# Patient Record
Sex: MALE | Race: OTHER | Employment: OTHER | URBAN - METROPOLITAN AREA
[De-identification: names, ages, dates, MRNs, and addresses within clinical notes are randomized per-mention and may not be internally consistent; named-entity substitution may affect disease eponyms.]

---

## 2017-01-16 ENCOUNTER — LAB REQUISITION (OUTPATIENT)
Dept: LAB | Facility: HOSPITAL | Age: 67
End: 2017-01-16

## 2017-01-16 DIAGNOSIS — R73.9 HYPERGLYCEMIA: ICD-10-CM

## 2017-01-16 PROCEDURE — 83036 HEMOGLOBIN GLYCOSYLATED A1C: CPT | Performed by: GENERAL PRACTICE

## 2017-01-17 LAB — HBA1C MFR BLD: 7.9 % (ref 4–6)

## 2017-10-13 ENCOUNTER — LAB REQUISITION (OUTPATIENT)
Dept: LAB | Facility: HOSPITAL | Age: 67
End: 2017-10-13
Payer: COMMERCIAL

## 2017-10-13 DIAGNOSIS — Z12.5 ENCOUNTER FOR SCREENING FOR MALIGNANT NEOPLASM OF PROSTATE: ICD-10-CM

## 2017-10-13 DIAGNOSIS — E11.65 TYPE 2 DIABETES MELLITUS WITH HYPERGLYCEMIA (HCC): ICD-10-CM

## 2017-10-13 PROCEDURE — 80061 LIPID PANEL: CPT | Performed by: GENERAL PRACTICE

## 2017-10-13 PROCEDURE — 83036 HEMOGLOBIN GLYCOSYLATED A1C: CPT | Performed by: GENERAL PRACTICE

## 2017-10-13 PROCEDURE — 80053 COMPREHEN METABOLIC PANEL: CPT | Performed by: GENERAL PRACTICE

## 2018-01-04 ENCOUNTER — OFFICE VISIT (OUTPATIENT)
Dept: FAMILY MEDICINE CLINIC | Facility: CLINIC | Age: 68
End: 2018-01-04

## 2018-01-04 VITALS
HEIGHT: 68 IN | HEART RATE: 51 BPM | WEIGHT: 137 LBS | TEMPERATURE: 98 F | DIASTOLIC BLOOD PRESSURE: 67 MMHG | BODY MASS INDEX: 20.76 KG/M2 | SYSTOLIC BLOOD PRESSURE: 133 MMHG

## 2018-01-04 DIAGNOSIS — Z12.11 COLON CANCER SCREENING: ICD-10-CM

## 2018-01-04 DIAGNOSIS — IMO0001 UNCONTROLLED TYPE 2 DIABETES MELLITUS WITHOUT COMPLICATION, WITH LONG-TERM CURRENT USE OF INSULIN: Primary | ICD-10-CM

## 2018-01-04 DIAGNOSIS — R20.8 BURNING SENSATION OF FEET: ICD-10-CM

## 2018-01-04 DIAGNOSIS — E78.00 HYPERCHOLESTEREMIA: ICD-10-CM

## 2018-01-04 PROCEDURE — 99212 OFFICE O/P EST SF 10 MIN: CPT | Performed by: FAMILY MEDICINE

## 2018-01-04 PROCEDURE — 99203 OFFICE O/P NEW LOW 30 MIN: CPT | Performed by: FAMILY MEDICINE

## 2018-01-04 RX ORDER — PANTOPRAZOLE SODIUM 40 MG/1
40 TABLET, DELAYED RELEASE ORAL
COMMUNITY
End: 2018-10-22

## 2018-01-04 NOTE — PROGRESS NOTES
HPI:    Patient ID: Elisa Lopez is a 79year old male. HPI     Patient is here new to me. He states his former PCP is retiring. He states his diabetes has been somewhat controlled. Last A1c was 7.8 and prior to that he states has been 7.5.   He i BD INSULIN SYRINGE 30G X 1/2\" 0.5 ML Does not apply Misc 1/2ml cc capacity/12.7mm in Length/30 gauge BD insulin Syringes with BD Ultra Fine [id number] STERILE SINGLE USE SYRINGES for U-100 INSULINDoses up to [facility]# [id number] LOT [id number]  Disp: Metabolic Panel (8) [E]      Lipid Panel [E]      TSH W Reflex To Free T4 [E]      Vitamin B12 [E]      Hemoglobin A1C [E]      Microalb/Creat Ratio, Random Urine [E]    Meds This Visit:    No prescriptions requested or ordered in this encounter       Imag

## 2018-01-09 ENCOUNTER — OFFICE VISIT (OUTPATIENT)
Dept: PODIATRY CLINIC | Facility: CLINIC | Age: 68
End: 2018-01-09

## 2018-01-09 DIAGNOSIS — E11.42 DIABETIC POLYNEUROPATHY ASSOCIATED WITH TYPE 2 DIABETES MELLITUS (HCC): Primary | ICD-10-CM

## 2018-01-09 DIAGNOSIS — Q89.9 DEFORMITY: ICD-10-CM

## 2018-01-09 PROCEDURE — 99212 OFFICE O/P EST SF 10 MIN: CPT | Performed by: PODIATRIST

## 2018-01-09 PROCEDURE — 99203 OFFICE O/P NEW LOW 30 MIN: CPT | Performed by: PODIATRIST

## 2018-01-09 NOTE — PROGRESS NOTES
HPI:    Patient ID: Erin Rand is a 79year old male. HPI  61-year-old diabetic presents as a new patient to me on referral from . Patient's chief concern is chronic recurrent callus on both of his feet.   History indicates that he has been He has skin grafting over the dorsal aspect of the left foot. There are plantar calluses associated with malposition metatarsals as a result of the injury. There is some loss of sensation on this evaluate.   I discussed each of these issues and concerns

## 2018-02-15 ENCOUNTER — APPOINTMENT (OUTPATIENT)
Dept: LAB | Age: 68
End: 2018-02-15
Attending: FAMILY MEDICINE
Payer: COMMERCIAL

## 2018-02-15 DIAGNOSIS — R20.8 BURNING SENSATION OF FEET: ICD-10-CM

## 2018-02-15 DIAGNOSIS — IMO0001 UNCONTROLLED TYPE 2 DIABETES MELLITUS WITHOUT COMPLICATION, WITH LONG-TERM CURRENT USE OF INSULIN: ICD-10-CM

## 2018-02-15 DIAGNOSIS — E78.00 HYPERCHOLESTEREMIA: ICD-10-CM

## 2018-02-15 LAB
ALT SERPL-CCNC: 20 U/L (ref 17–63)
ANION GAP SERPL CALC-SCNC: 6 MMOL/L (ref 0–18)
AST SERPL-CCNC: 20 U/L (ref 15–41)
BUN SERPL-MCNC: 15 MG/DL (ref 8–20)
BUN/CREAT SERPL: 14.4 (ref 10–20)
CALCIUM SERPL-MCNC: 9.4 MG/DL (ref 8.5–10.5)
CHLORIDE SERPL-SCNC: 106 MMOL/L (ref 95–110)
CHOLEST SERPL-MCNC: 165 MG/DL (ref 110–200)
CO2 SERPL-SCNC: 29 MMOL/L (ref 22–32)
CREAT SERPL-MCNC: 1.04 MG/DL (ref 0.5–1.5)
CREAT UR-MCNC: 100.2 MG/DL
GLUCOSE SERPL-MCNC: 109 MG/DL (ref 70–99)
HBA1C MFR BLD: 7.9 % (ref 4–6)
HDLC SERPL-MCNC: 35 MG/DL
LDLC SERPL CALC-MCNC: 109 MG/DL (ref 0–99)
MICROALBUMIN UR-MCNC: 0.2 MG/DL (ref 0–1.8)
MICROALBUMIN/CREAT UR: 2 MG/G{CREAT} (ref 0–20)
NONHDLC SERPL-MCNC: 130 MG/DL
OSMOLALITY UR CALC.SUM OF ELEC: 293 MOSM/KG (ref 275–295)
POTASSIUM SERPL-SCNC: 4.4 MMOL/L (ref 3.3–5.1)
SODIUM SERPL-SCNC: 141 MMOL/L (ref 136–144)
TRIGL SERPL-MCNC: 104 MG/DL (ref 1–149)
TSH SERPL-ACNC: 3.27 UIU/ML (ref 0.45–5.33)
VIT B12 SERPL-MCNC: 246 PG/ML (ref 181–914)

## 2018-02-15 PROCEDURE — 84443 ASSAY THYROID STIM HORMONE: CPT

## 2018-02-15 PROCEDURE — 84450 TRANSFERASE (AST) (SGOT): CPT

## 2018-02-15 PROCEDURE — 82043 UR ALBUMIN QUANTITATIVE: CPT

## 2018-02-15 PROCEDURE — 80048 BASIC METABOLIC PNL TOTAL CA: CPT

## 2018-02-15 PROCEDURE — 82607 VITAMIN B-12: CPT

## 2018-02-15 PROCEDURE — 83036 HEMOGLOBIN GLYCOSYLATED A1C: CPT

## 2018-02-15 PROCEDURE — 80061 LIPID PANEL: CPT

## 2018-02-15 PROCEDURE — 82570 ASSAY OF URINE CREATININE: CPT

## 2018-02-15 PROCEDURE — 36415 COLL VENOUS BLD VENIPUNCTURE: CPT

## 2018-02-15 PROCEDURE — 84460 ALANINE AMINO (ALT) (SGPT): CPT

## 2018-04-05 ENCOUNTER — NURSE TRIAGE (OUTPATIENT)
Dept: OTHER | Age: 68
End: 2018-04-05

## 2018-04-05 NOTE — TELEPHONE ENCOUNTER
Action Requested: Summary for Provider     []  Critical Lab, Recommendations Needed  [x] Need Additional Advice  []   FYI    []   Need Orders  [] Need Medications Sent to Pharmacy  []  Other     SUMMARY: pt asked to schedule appt for finger pain, right mid

## 2018-04-09 ENCOUNTER — HOSPITAL ENCOUNTER (OUTPATIENT)
Dept: GENERAL RADIOLOGY | Age: 68
Discharge: HOME | End: 2018-04-09
Attending: FAMILY MEDICINE
Payer: COMMERCIAL

## 2018-04-09 ENCOUNTER — OFFICE VISIT (OUTPATIENT)
Dept: FAMILY MEDICINE CLINIC | Facility: CLINIC | Age: 68
End: 2018-04-09

## 2018-04-09 VITALS
BODY MASS INDEX: 20.31 KG/M2 | WEIGHT: 134 LBS | TEMPERATURE: 100 F | HEIGHT: 68 IN | DIASTOLIC BLOOD PRESSURE: 70 MMHG | SYSTOLIC BLOOD PRESSURE: 121 MMHG | HEART RATE: 52 BPM

## 2018-04-09 DIAGNOSIS — E78.00 HYPERCHOLESTEREMIA: ICD-10-CM

## 2018-04-09 DIAGNOSIS — M79.644 FINGER PAIN, RIGHT: Primary | ICD-10-CM

## 2018-04-09 DIAGNOSIS — IMO0001 UNCONTROLLED TYPE 2 DIABETES MELLITUS WITHOUT COMPLICATION, WITH LONG-TERM CURRENT USE OF INSULIN: ICD-10-CM

## 2018-04-09 DIAGNOSIS — M79.644 FINGER PAIN, RIGHT: ICD-10-CM

## 2018-04-09 DIAGNOSIS — E53.8 B12 DEFICIENCY: ICD-10-CM

## 2018-04-09 PROCEDURE — 99212 OFFICE O/P EST SF 10 MIN: CPT | Performed by: FAMILY MEDICINE

## 2018-04-09 PROCEDURE — 73140 X-RAY EXAM OF FINGER(S): CPT | Performed by: FAMILY MEDICINE

## 2018-04-09 PROCEDURE — 99214 OFFICE O/P EST MOD 30 MIN: CPT | Performed by: FAMILY MEDICINE

## 2018-04-09 RX ORDER — CEPHALEXIN 500 MG/1
500 CAPSULE ORAL 3 TIMES DAILY
Qty: 15 CAPSULE | Refills: 0 | Status: SHIPPED | OUTPATIENT
Start: 2018-04-09 | End: 2018-04-14

## 2018-04-09 NOTE — PROGRESS NOTES
HPI:    Patient ID: Ronn Seo is a 79year old male. HPI     Patient states he injured his right middle finger a few days ago while at work. He works as  overnight.   He states that he was closing small window which is a glass windo change, diaphoresis, fatigue, fever and unexpected weight change. Respiratory: Negative for cough and shortness of breath. Cardiovascular: Negative for chest pain and palpitations. Gastrointestinal: Negative for abdominal pain, nausea and vomiting. No thyromegaly present. Cardiovascular: Normal rate and regular rhythm. Pulmonary/Chest: Effort normal and breath sounds normal. He has no wheezes. Abdominal: Soft. Bowel sounds are normal. There is no tenderness.    Musculoskeletal: He exhibits tend

## 2018-05-09 ENCOUNTER — OFFICE VISIT (OUTPATIENT)
Dept: FAMILY MEDICINE CLINIC | Facility: CLINIC | Age: 68
End: 2018-05-09

## 2018-05-09 VITALS
HEART RATE: 60 BPM | HEIGHT: 68 IN | DIASTOLIC BLOOD PRESSURE: 71 MMHG | BODY MASS INDEX: 20 KG/M2 | TEMPERATURE: 98 F | SYSTOLIC BLOOD PRESSURE: 131 MMHG | WEIGHT: 132 LBS

## 2018-05-09 DIAGNOSIS — L90.5 SCAR OF FINGER: ICD-10-CM

## 2018-05-09 DIAGNOSIS — R21 RASH AND NONSPECIFIC SKIN ERUPTION: Primary | ICD-10-CM

## 2018-05-09 PROCEDURE — 99214 OFFICE O/P EST MOD 30 MIN: CPT | Performed by: FAMILY MEDICINE

## 2018-05-09 PROCEDURE — 99212 OFFICE O/P EST SF 10 MIN: CPT | Performed by: FAMILY MEDICINE

## 2018-05-09 NOTE — PROGRESS NOTES
HPI:    Patient ID: David Fletcher is a 79year old male. HPI     Patient presents with: Follow - Up: Right middle finger    Other: pink patch on his back    Has a recurrent rash lft side back x 2 yrs.   Biopsy neg    Biopsy 3/2016    Skin, lower back which he uses temporarily and then he stopped    Scar on his distal phalanx of right middle finger appears to have a scar from his injury a month ago. This is nontender, no discharge. No redness.               ASSESSMENT/PLAN:   Rash and nonspecific skin

## 2018-05-17 ENCOUNTER — OFFICE VISIT (OUTPATIENT)
Dept: OPHTHALMOLOGY | Facility: CLINIC | Age: 68
End: 2018-05-17

## 2018-05-17 ENCOUNTER — TELEPHONE (OUTPATIENT)
Dept: OPHTHALMOLOGY | Facility: CLINIC | Age: 68
End: 2018-05-17

## 2018-05-17 DIAGNOSIS — H25.13 AGE-RELATED NUCLEAR CATARACT OF BOTH EYES: ICD-10-CM

## 2018-05-17 DIAGNOSIS — H43.393 FLOATERS IN VISUAL FIELD, BILATERAL: ICD-10-CM

## 2018-05-17 DIAGNOSIS — E11.9 DIABETES MELLITUS TYPE 2 WITHOUT RETINOPATHY (HCC): Primary | ICD-10-CM

## 2018-05-17 PROCEDURE — 99243 OFF/OP CNSLTJ NEW/EST LOW 30: CPT | Performed by: OPHTHALMOLOGY

## 2018-05-17 PROCEDURE — 92015 DETERMINE REFRACTIVE STATE: CPT | Performed by: OPHTHALMOLOGY

## 2018-05-17 PROCEDURE — 99212 OFFICE O/P EST SF 10 MIN: CPT | Performed by: OPHTHALMOLOGY

## 2018-05-17 NOTE — PATIENT INSTRUCTIONS
Age-related nuclear cataract of both eyes  Discussed early cataracts with patient. Told patient that cataracts are age appropriate and they are not surgical at this time. No treatment recommended at this time.      Diabetes mellitus type 2 without retinopa

## 2018-05-17 NOTE — PROGRESS NOTES
Nelson Blair is a 79year old male.     HPI:     HPI     Diabetic Eye Exam    Additional comments: Pt has been a diabetic for 10 years  0 years on pills/  10 years on Insulin   Pt checks his/her BS once daily   Pt's last blood sugar was  114  Last HA1C mouth daily. Disp:  Rfl:    MetFORMIN HCl 500 MG Oral Tab Take 500 mg by mouth 2 (two) times daily with meals. Disp:  Rfl: 1   ramipril 5 MG Oral Cap Take 5 mg by mouth daily.    Disp:  Rfl: 1       Allergies:  No Known Allergies    ROS:       PHYSICAL EX patient. Told patient that cataracts are age appropriate and they are not surgical at this time. No treatment recommended at this time.      Diabetes mellitus type 2 without retinopathy ([address]  Diabetes type II: no background of retinopathy, no signs of júnior

## 2018-07-05 ENCOUNTER — TELEPHONE (OUTPATIENT)
Dept: OTHER | Age: 68
End: 2018-07-05

## 2018-07-05 NOTE — TELEPHONE ENCOUNTER
Spoke with [facility] and verifies receipt of Levemir Rx. No further questions/concerns at this time. LM for dtr that Rx has been received by [facility] and to call back if any questions/concerns.

## 2018-07-05 NOTE — TELEPHONE ENCOUNTER
Spoke with daughter Adriano Cabral and patient next to her--requesting refill on Levemir. Patient and family in [place] for the week--patient has insulin syringes, but forgot to pack insulin.      Daughter called [facility] in [place] to transfer a refill,

## 2018-07-11 ENCOUNTER — OFFICE VISIT (OUTPATIENT)
Dept: PODIATRY CLINIC | Facility: CLINIC | Age: 68
End: 2018-07-11

## 2018-07-11 DIAGNOSIS — E10.42 DIABETIC POLYNEUROPATHY ASSOCIATED WITH TYPE 1 DIABETES MELLITUS (HCC): Primary | ICD-10-CM

## 2018-07-11 PROCEDURE — 99213 OFFICE O/P EST LOW 20 MIN: CPT | Performed by: PODIATRIST

## 2018-07-11 PROCEDURE — 99212 OFFICE O/P EST SF 10 MIN: CPT | Performed by: PODIATRIST

## 2018-07-11 NOTE — PROGRESS NOTES
HPI:    Patient ID: Iggy Viera is a 79year old male. HPI  40-year-old diabetic presents for evaluation and care. He does have some numbish and burning discomforts. It is increasing and progressive frustration.   He reports that his sugar levels appropriate         ASSESSMENT/PLAN:   Diabetic polyneuropathy associated with type 1 diabetes mellitus (hcc)  (primary encounter diagnosis)    No orders of the defined types were placed in this encounter.       Meds This Visit:  No prescriptions requested

## 2018-07-19 NOTE — TELEPHONE ENCOUNTER
Pharmacy called in stating that Pt needs refill on medication below. It was previously filled by a different PCP, so she could not e-scribe the request.     Please advise.      Current Outpatient Prescriptions:   •  ramipril 5 MG Oral Cap, Take 5 mg by mout

## 2018-07-21 RX ORDER — RAMIPRIL 5 MG/1
5 CAPSULE ORAL DAILY
Qty: 90 CAPSULE | Refills: 0 | Status: SHIPPED | OUTPATIENT
Start: 2018-07-21 | End: 2018-07-25 | Stop reason: ALTCHOICE

## 2018-07-23 ENCOUNTER — TELEPHONE (OUTPATIENT)
Dept: ORTHOPEDICS CLINIC | Facility: CLINIC | Age: 68
End: 2018-07-23

## 2018-07-23 NOTE — TELEPHONE ENCOUNTER
Pt 's insurance does not cover diabetic shoes. Call to [facility]. No answer. Left voice message. Suggesting he call Carlos or Dima to see if he can afford an out of pocket expense.   REquested call back in this to see if he will proceed with the s

## 2018-07-24 ENCOUNTER — TELEPHONE (OUTPATIENT)
Dept: FAMILY MEDICINE CLINIC | Facility: CLINIC | Age: 68
End: 2018-07-24

## 2018-07-24 NOTE — TELEPHONE ENCOUNTER
Call back to [address] [place] [address]. No answer. Left voice message. Making sure they did get yesterdays message and if any questions  REquested call back if needed.

## 2018-07-24 NOTE — TELEPHONE ENCOUNTER
Pharmacy called in stating that the medication below is on back order until October and they do not specify a date. Pharmacy asking if there is another medication that can be prescribed. Please advise.      Current Outpatient Prescriptions:   •  ramipri

## 2018-07-24 NOTE — TELEPHONE ENCOUNTER
Called and spoke to [employer]. Explained that his diabetic shoes will not be covered by insurance.   Explained he needs to call and find out if this is something he can afford out of pocket  Given the phone number to [employer] and [address] [employer]  Requested call back to [employer]

## 2018-07-25 RX ORDER — LISINOPRIL 10 MG/1
10 TABLET ORAL DAILY
Qty: 90 TABLET | Refills: 0 | Status: SHIPPED | OUTPATIENT
Start: 2018-07-25 | End: 2018-08-29

## 2018-07-25 NOTE — TELEPHONE ENCOUNTER
Called pt and informed him Lisinopril was sent in.  Pt verbalized understanding with no further questions asked

## 2018-08-15 ENCOUNTER — TELEPHONE (OUTPATIENT)
Dept: FAMILY MEDICINE CLINIC | Facility: CLINIC | Age: 68
End: 2018-08-15

## 2018-08-15 NOTE — TELEPHONE ENCOUNTER
Pt's wife [phone] called in requesting to have pt come in with her on 8/29 at 4:30 pm for a med f/u. At this time there are no open appointments available. AA is it ok to book pt at 4:45 in a SDS slot?      Please reply to pool: MONA Hoffman

## 2018-08-29 ENCOUNTER — OFFICE VISIT (OUTPATIENT)
Dept: FAMILY MEDICINE CLINIC | Facility: CLINIC | Age: 68
End: 2018-08-29

## 2018-08-29 VITALS
SYSTOLIC BLOOD PRESSURE: 123 MMHG | DIASTOLIC BLOOD PRESSURE: 72 MMHG | TEMPERATURE: 98 F | WEIGHT: 130 LBS | BODY MASS INDEX: 19.7 KG/M2 | HEIGHT: 68 IN | HEART RATE: 54 BPM

## 2018-08-29 DIAGNOSIS — E11.42 DIABETIC POLYNEUROPATHY ASSOCIATED WITH TYPE 2 DIABETES MELLITUS (HCC): ICD-10-CM

## 2018-08-29 DIAGNOSIS — Z12.11 COLON CANCER SCREENING: ICD-10-CM

## 2018-08-29 DIAGNOSIS — E53.8 B12 DEFICIENCY: ICD-10-CM

## 2018-08-29 DIAGNOSIS — E78.00 HYPERCHOLESTEREMIA: ICD-10-CM

## 2018-08-29 DIAGNOSIS — Z23 NEED FOR 23-POLYVALENT PNEUMOCOCCAL POLYSACCHARIDE VACCINE: ICD-10-CM

## 2018-08-29 DIAGNOSIS — IMO0001 UNCONTROLLED TYPE 2 DIABETES MELLITUS WITHOUT COMPLICATION, WITH LONG-TERM CURRENT USE OF INSULIN: Primary | ICD-10-CM

## 2018-08-29 PROCEDURE — 90471 IMMUNIZATION ADMIN: CPT | Performed by: FAMILY MEDICINE

## 2018-08-29 PROCEDURE — 99212 OFFICE O/P EST SF 10 MIN: CPT | Performed by: FAMILY MEDICINE

## 2018-08-29 PROCEDURE — 90732 PPSV23 VACC 2 YRS+ SUBQ/IM: CPT | Performed by: FAMILY MEDICINE

## 2018-08-29 PROCEDURE — 99214 OFFICE O/P EST MOD 30 MIN: CPT | Performed by: FAMILY MEDICINE

## 2018-08-29 RX ORDER — LISINOPRIL 10 MG/1
10 TABLET ORAL DAILY
Qty: 90 TABLET | Refills: 0 | Status: SHIPPED | OUTPATIENT
Start: 2018-08-29 | End: 2018-10-11

## 2018-08-30 NOTE — PROGRESS NOTES
HPI:    Patient ID: Issa Barclay is a 79year old male. HPI     Patient presents with:  Diabetes: follow up   Other: would like an order for labs    Patient is here for follow-up on his diabetes.   He states he is trying to do better and has cut back LOT [id number] Disp: 90 each Rfl: 3   MetFORMIN HCl 500 MG Oral Tab Take 1 tablet (500 mg total) by mouth 2 (two) times daily with meals.  Disp: 180 tablet Rfl: 1   Pantoprazole Sodium 40 MG Oral Tab EC Take 40 mg by mouth every morning before breakfast. Jayda Metabolic Panel (14) [E]      Lipid Panel [E]      Hemoglobin A1C [E]      Vitamin B12 [E]      PNEUMOCOCCAL IMM, 23    Meds This Visit:  Signed Prescriptions Disp Refills    lisinopril 10 MG Oral Tab 90 tablet 0      Sig: Take 1 tablet (10 mg total) by mo

## 2018-09-13 NOTE — TELEPHONE ENCOUNTER
Pharmacy requesting two vials of     Current Outpatient Medications:  insulin detemir (LEVEMIR) 100 UNIT/ML Subcutaneous Solution Inject 25 Units into the skin daily.  Inject 25IU daily Disp: 1 vial Rfl: 0     As the price is the same whether fetes 1 or 2 a

## 2018-09-13 NOTE — TELEPHONE ENCOUNTER
Please review; protocol failed (d/t last A1c level)--from message below pharmacy is asking for Rx to be for 2 vials instead of the one vial you sent on 9/9/18.

## 2018-09-20 ENCOUNTER — TELEPHONE (OUTPATIENT)
Dept: CASE MANAGEMENT | Age: 68
End: 2018-09-20

## 2018-09-20 NOTE — TELEPHONE ENCOUNTER
Patient is due for colorectal screening. Referral written 8/29/18. Left message to call back [phone].

## 2018-10-05 ENCOUNTER — APPOINTMENT (OUTPATIENT)
Dept: LAB | Age: 68
End: 2018-10-05
Attending: FAMILY MEDICINE
Payer: COMMERCIAL

## 2018-10-05 DIAGNOSIS — E78.00 HYPERCHOLESTEREMIA: ICD-10-CM

## 2018-10-05 DIAGNOSIS — E53.8 B12 DEFICIENCY: ICD-10-CM

## 2018-10-05 DIAGNOSIS — IMO0001 UNCONTROLLED TYPE 2 DIABETES MELLITUS WITHOUT COMPLICATION, WITH LONG-TERM CURRENT USE OF INSULIN: ICD-10-CM

## 2018-10-05 PROCEDURE — 82607 VITAMIN B-12: CPT

## 2018-10-05 PROCEDURE — 80053 COMPREHEN METABOLIC PANEL: CPT

## 2018-10-05 PROCEDURE — 36415 COLL VENOUS BLD VENIPUNCTURE: CPT

## 2018-10-05 PROCEDURE — 83036 HEMOGLOBIN GLYCOSYLATED A1C: CPT

## 2018-10-05 PROCEDURE — 80061 LIPID PANEL: CPT

## 2018-10-11 DIAGNOSIS — IMO0001 UNCONTROLLED TYPE 2 DIABETES MELLITUS WITHOUT COMPLICATION, WITH LONG-TERM CURRENT USE OF INSULIN: Primary | ICD-10-CM

## 2018-10-11 RX ORDER — LISINOPRIL 10 MG/1
10 TABLET ORAL DAILY
Qty: 90 TABLET | Refills: 3 | Status: SHIPPED | OUTPATIENT
Start: 2018-10-11 | End: 2019-11-29

## 2018-10-22 RX ORDER — PANTOPRAZOLE SODIUM 40 MG/1
40 TABLET, DELAYED RELEASE ORAL
Qty: 90 TABLET | Refills: 0 | Status: SHIPPED | OUTPATIENT
Start: 2018-10-22 | End: 2021-09-03

## 2018-10-22 NOTE — TELEPHONE ENCOUNTER
Pt needs a refill on medication   Was being prescribed from a different Dr. Alejandro Duran pt was seeing but has retired.      Current Outpatient Medications:                          Pantoprazole Sodium 40 MG Oral Tab EC Take 40 mg by mouth every morning before yuniel

## 2019-01-14 NOTE — TELEPHONE ENCOUNTER
Pharmacy is requesting refill for pt. insulin detemir (LEVEMIR) 100 UNIT/ML Subcutaneous Solution Inject 25 Units into the skin daily.  Inject 25IU daily Disp: 2 vial Rfl: 0

## 2019-02-28 ENCOUNTER — APPOINTMENT (OUTPATIENT)
Dept: LAB | Age: 69
End: 2019-02-28
Attending: FAMILY MEDICINE
Payer: COMMERCIAL

## 2019-02-28 DIAGNOSIS — IMO0001 UNCONTROLLED TYPE 2 DIABETES MELLITUS WITHOUT COMPLICATION, WITH LONG-TERM CURRENT USE OF INSULIN: ICD-10-CM

## 2019-02-28 LAB
ALBUMIN SERPL-MCNC: 4 G/DL (ref 3.4–5)
ALBUMIN/GLOB SERPL: 1.2 {RATIO} (ref 1–2)
ALP LIVER SERPL-CCNC: 78 U/L (ref 45–117)
ALT SERPL-CCNC: 23 U/L (ref 16–61)
ANION GAP SERPL CALC-SCNC: 4 MMOL/L (ref 0–18)
AST SERPL-CCNC: 16 U/L (ref 15–37)
BILIRUB SERPL-MCNC: 0.7 MG/DL (ref 0.1–2)
BUN BLD-MCNC: 14 MG/DL (ref 7–18)
BUN/CREAT SERPL: 12.6 (ref 10–20)
CALCIUM BLD-MCNC: 9.2 MG/DL (ref 8.5–10.1)
CHLORIDE SERPL-SCNC: 107 MMOL/L (ref 98–107)
CO2 SERPL-SCNC: 30 MMOL/L (ref 21–32)
CREAT BLD-MCNC: 1.11 MG/DL (ref 0.7–1.3)
EST. AVERAGE GLUCOSE BLD GHB EST-MCNC: 174 MG/DL (ref 68–126)
GLOBULIN PLAS-MCNC: 3.4 G/DL (ref 2.8–4.4)
GLUCOSE BLD-MCNC: 128 MG/DL (ref 70–99)
HBA1C MFR BLD HPLC: 7.7 % (ref ?–5.7)
M PROTEIN MFR SERPL ELPH: 7.4 G/DL (ref 6.4–8.2)
OSMOLALITY SERPL CALC.SUM OF ELEC: 294 MOSM/KG (ref 275–295)
POTASSIUM SERPL-SCNC: 4.1 MMOL/L (ref 3.5–5.1)
SODIUM SERPL-SCNC: 141 MMOL/L (ref 136–145)

## 2019-02-28 PROCEDURE — 36415 COLL VENOUS BLD VENIPUNCTURE: CPT

## 2019-02-28 PROCEDURE — 83036 HEMOGLOBIN GLYCOSYLATED A1C: CPT

## 2019-02-28 PROCEDURE — 80053 COMPREHEN METABOLIC PANEL: CPT

## 2019-03-20 ENCOUNTER — OFFICE VISIT (OUTPATIENT)
Dept: FAMILY MEDICINE CLINIC | Facility: CLINIC | Age: 69
End: 2019-03-20

## 2019-03-20 ENCOUNTER — APPOINTMENT (OUTPATIENT)
Dept: LAB | Age: 69
End: 2019-03-20
Attending: FAMILY MEDICINE
Payer: COMMERCIAL

## 2019-03-20 VITALS
TEMPERATURE: 98 F | HEIGHT: 68 IN | WEIGHT: 130 LBS | HEART RATE: 73 BPM | BODY MASS INDEX: 19.7 KG/M2 | DIASTOLIC BLOOD PRESSURE: 71 MMHG | SYSTOLIC BLOOD PRESSURE: 126 MMHG

## 2019-03-20 DIAGNOSIS — IMO0001 UNCONTROLLED TYPE 2 DIABETES MELLITUS WITHOUT COMPLICATION, WITH LONG-TERM CURRENT USE OF INSULIN: Primary | ICD-10-CM

## 2019-03-20 DIAGNOSIS — Z12.5 PROSTATE CANCER SCREENING: ICD-10-CM

## 2019-03-20 DIAGNOSIS — R93.0: ICD-10-CM

## 2019-03-20 DIAGNOSIS — E78.00 HYPERCHOLESTEREMIA: ICD-10-CM

## 2019-03-20 DIAGNOSIS — IMO0001 UNCONTROLLED TYPE 2 DIABETES MELLITUS WITHOUT COMPLICATION, WITH LONG-TERM CURRENT USE OF INSULIN: ICD-10-CM

## 2019-03-20 LAB
COMPLEXED PSA SERPL-MCNC: 3.07 NG/ML (ref ?–4)
CREAT UR-SCNC: 102 MG/DL
MICROALBUMIN UR-MCNC: 0.52 MG/DL
MICROALBUMIN/CREAT 24H UR-RTO: 5.1 UG/MG (ref ?–30)

## 2019-03-20 PROCEDURE — 36415 COLL VENOUS BLD VENIPUNCTURE: CPT

## 2019-03-20 PROCEDURE — 82043 UR ALBUMIN QUANTITATIVE: CPT

## 2019-03-20 PROCEDURE — 99214 OFFICE O/P EST MOD 30 MIN: CPT | Performed by: FAMILY MEDICINE

## 2019-03-20 PROCEDURE — 82570 ASSAY OF URINE CREATININE: CPT

## 2019-03-20 PROCEDURE — 99212 OFFICE O/P EST SF 10 MIN: CPT | Performed by: FAMILY MEDICINE

## 2019-03-20 NOTE — PROGRESS NOTES
HPI:    Patient ID: Sherma Epley is a 76year old male.     HPI  Patient presents with:  Lab Results: follow up   Referral: For ENT pt states he got a note from the Dentist to get his mandible examined     Component      Latest Ref Rng & Units 2/28/2019 polyuria. Genitourinary: Negative for difficulty urinating, dysuria, flank pain, frequency and hematuria. Musculoskeletal: Negative for back pain and gait problem. Skin: Negative for rash.    Neurological: Negative for dizziness, weakness, numbness an Soft. Bowel sounds are normal. There is no tenderness. Musculoskeletal: He exhibits no edema. Lymphadenopathy:     He has no cervical adenopathy. Neurological: He is alert. Skin: Skin is warm, dry and intact.    Psychiatric: He has a normal mood and

## 2019-05-17 NOTE — TELEPHONE ENCOUNTER
Pt's pharmacy called in requesting new prescriptions for the following medications:  Please advise     Contour Next test strips and Microlet Lancets

## 2019-05-18 RX ORDER — LANCETS
EACH MISCELLANEOUS
Qty: 100 EACH | Refills: 3 | Status: SHIPPED | OUTPATIENT
Start: 2019-05-18 | End: 2023-07-07

## 2019-08-13 ENCOUNTER — TELEPHONE (OUTPATIENT)
Dept: CASE MANAGEMENT | Age: 69
End: 2019-08-13

## 2019-08-13 DIAGNOSIS — Z12.11 COLON CANCER SCREENING: Primary | ICD-10-CM

## 2019-08-13 NOTE — TELEPHONE ENCOUNTER
Patient is due for colorectal cancer screening. Please order GI consult for colonoscopy or FIT if appropriate.  Thank you

## 2019-08-14 NOTE — TELEPHONE ENCOUNTER
Patient is due for colorectal cancer screening, FIT ordered 8/13/19. Left message to call back [phone].

## 2019-08-21 ENCOUNTER — TELEPHONE (OUTPATIENT)
Dept: FAMILY MEDICINE CLINIC | Facility: CLINIC | Age: 69
End: 2019-08-21

## 2019-08-21 DIAGNOSIS — IMO0001 UNCONTROLLED TYPE 2 DIABETES MELLITUS WITHOUT COMPLICATION, WITH LONG-TERM CURRENT USE OF INSULIN: ICD-10-CM

## 2019-08-21 DIAGNOSIS — E78.00 HYPERCHOLESTEREMIA: Primary | ICD-10-CM

## 2019-09-27 ENCOUNTER — APPOINTMENT (OUTPATIENT)
Dept: LAB | Age: 69
End: 2019-09-27
Attending: FAMILY MEDICINE
Payer: COMMERCIAL

## 2019-09-27 DIAGNOSIS — E78.00 HYPERCHOLESTEREMIA: ICD-10-CM

## 2019-09-27 DIAGNOSIS — IMO0001 UNCONTROLLED TYPE 2 DIABETES MELLITUS WITHOUT COMPLICATION, WITH LONG-TERM CURRENT USE OF INSULIN: ICD-10-CM

## 2019-09-27 PROCEDURE — 80053 COMPREHEN METABOLIC PANEL: CPT

## 2019-09-27 PROCEDURE — 83036 HEMOGLOBIN GLYCOSYLATED A1C: CPT

## 2019-09-27 PROCEDURE — 80061 LIPID PANEL: CPT

## 2019-09-27 PROCEDURE — 36415 COLL VENOUS BLD VENIPUNCTURE: CPT

## 2019-09-27 RX ORDER — PEN NEEDLE, DIABETIC 29 G X1/2"
NEEDLE, DISPOSABLE MISCELLANEOUS
Qty: 90 EACH | Refills: 2 | Status: SHIPPED | OUTPATIENT
Start: 2019-09-27 | End: 2019-10-01

## 2019-09-28 NOTE — TELEPHONE ENCOUNTER
Refill passed per [facility] protocol.   Refill Protocol Appointment Criteria  · Appointment scheduled in the past 12 months or in the next 3 months  Recent Outpatient Visits            6 months ago Uncontrolled type 2 diabetes mellitus without complic

## 2019-10-01 ENCOUNTER — OFFICE VISIT (OUTPATIENT)
Dept: FAMILY MEDICINE CLINIC | Facility: CLINIC | Age: 69
End: 2019-10-01

## 2019-10-01 VITALS
BODY MASS INDEX: 19.7 KG/M2 | DIASTOLIC BLOOD PRESSURE: 76 MMHG | SYSTOLIC BLOOD PRESSURE: 119 MMHG | TEMPERATURE: 98 F | HEIGHT: 68 IN | HEART RATE: 53 BPM | WEIGHT: 130 LBS

## 2019-10-01 DIAGNOSIS — L84 CALLUS OF FOOT: ICD-10-CM

## 2019-10-01 DIAGNOSIS — I10 ESSENTIAL HYPERTENSION: ICD-10-CM

## 2019-10-01 DIAGNOSIS — Z23 NEED FOR INFLUENZA VACCINATION: ICD-10-CM

## 2019-10-01 DIAGNOSIS — IMO0001 UNCONTROLLED TYPE 2 DIABETES MELLITUS WITHOUT COMPLICATION, WITH LONG-TERM CURRENT USE OF INSULIN: Primary | ICD-10-CM

## 2019-10-01 DIAGNOSIS — E78.00 HYPERCHOLESTEREMIA: ICD-10-CM

## 2019-10-01 DIAGNOSIS — Z23 NEED FOR VACCINATION WITH 13-POLYVALENT PNEUMOCOCCAL CONJUGATE VACCINE: ICD-10-CM

## 2019-10-01 PROCEDURE — 90662 IIV NO PRSV INCREASED AG IM: CPT | Performed by: FAMILY MEDICINE

## 2019-10-01 PROCEDURE — 90471 IMMUNIZATION ADMIN: CPT | Performed by: FAMILY MEDICINE

## 2019-10-01 PROCEDURE — 99214 OFFICE O/P EST MOD 30 MIN: CPT | Performed by: FAMILY MEDICINE

## 2019-10-01 NOTE — PROGRESS NOTES
HPI:    Patient ID: Iliana Frances is a 76year old male.     HPI  Patient presents with:  Lab Results  Knee Pain: left knee pt states he fell by the rails of his bulding     Has not seen eye doctor  Has not done FIT    Taking metformin 500mg  1 twice a d 100 each Rfl: 3   Pantoprazole Sodium 40 MG Oral Tab EC Take 1 tablet (40 mg total) by mouth every morning before breakfast. Patient reports taking prn Disp: 90 tablet Rfl: 0   lisinopril 10 MG Oral Tab Take 1 tablet (10 mg total) by mouth daily.  Disp: 90 daily. Inject 25IU daily  Dispense: 2 vial; Refill: 3    2. Hypercholesteremia  Lipids well controlled. Continue same. 3. Essential hypertension  Blood pressure remains slightly elevated today. Patient states blood pressures are usually good at home.

## 2019-11-05 ENCOUNTER — OFFICE VISIT (OUTPATIENT)
Dept: FAMILY MEDICINE CLINIC | Facility: CLINIC | Age: 69
End: 2019-11-05

## 2019-11-05 VITALS
SYSTOLIC BLOOD PRESSURE: 133 MMHG | TEMPERATURE: 99 F | WEIGHT: 130 LBS | DIASTOLIC BLOOD PRESSURE: 74 MMHG | BODY MASS INDEX: 19.7 KG/M2 | HEART RATE: 50 BPM | HEIGHT: 68 IN

## 2019-11-05 DIAGNOSIS — R30.0 DYSURIA: ICD-10-CM

## 2019-11-05 DIAGNOSIS — R35.0 URINARY FREQUENCY: Primary | ICD-10-CM

## 2019-11-05 DIAGNOSIS — IMO0001 UNCONTROLLED TYPE 2 DIABETES MELLITUS WITHOUT COMPLICATION, WITH LONG-TERM CURRENT USE OF INSULIN: ICD-10-CM

## 2019-11-05 PROCEDURE — 99214 OFFICE O/P EST MOD 30 MIN: CPT | Performed by: FAMILY MEDICINE

## 2019-11-05 PROCEDURE — 81002 URINALYSIS NONAUTO W/O SCOPE: CPT | Performed by: FAMILY MEDICINE

## 2019-11-05 RX ORDER — CIPROFLOXACIN 500 MG/1
500 TABLET, FILM COATED ORAL 2 TIMES DAILY
Qty: 28 TABLET | Refills: 0 | Status: SHIPPED | OUTPATIENT
Start: 2019-11-05 | End: 2019-11-19

## 2019-11-05 NOTE — PROGRESS NOTES
HPI:    Patient ID: Major Mass is a 76year old male. HPI  Patient presents with:  Urinary Frequency: and burning X 4 days     Has some burning with urination x 3-4 days  Former smoker , 2-3 cigs but quit 15 yrs ago  No pelvic pain  No fevers.   He and burning X 4 days      Physical Exam   Constitutional: He appears well-developed and well-nourished. Abdominal: Soft. Bowel sounds are normal. There is no tenderness.               ASSESSMENT/PLAN:   Urinary frequency  (primary encounter diagnosis)  Un

## 2019-11-07 ENCOUNTER — TELEPHONE (OUTPATIENT)
Dept: FAMILY MEDICINE CLINIC | Facility: CLINIC | Age: 69
End: 2019-11-07

## 2019-11-07 NOTE — TELEPHONE ENCOUNTER
Patient called in stating that he attempted to schedule with Dr. Marcos Maya, but first available if in January. He was advised by Dr. Alexey Morrell. Juan's office that he contact Dr. Rogelio Salmeron and ask her to reach out to help him get scheduled in sooner.      Please

## 2019-11-08 NOTE — TELEPHONE ENCOUNTER
Urology clinic staff: please advise if patient can be seen sooner than January with available provider. DX: urinary frequency.

## 2019-11-08 NOTE — TELEPHONE ENCOUNTER
Spoke to pt and offered appt with Earnstine Cool as she has soonest appt. Pt agreed. Appt scheduled for 11/14/19 at 1300. No further action required. FYI message sent to Dr. Daren Borjas.

## 2019-11-14 ENCOUNTER — OFFICE VISIT (OUTPATIENT)
Dept: SURGERY | Facility: CLINIC | Age: 69
End: 2019-11-14

## 2019-11-14 VITALS — HEART RATE: 54 BPM | SYSTOLIC BLOOD PRESSURE: 161 MMHG | TEMPERATURE: 98 F | DIASTOLIC BLOOD PRESSURE: 71 MMHG

## 2019-11-14 DIAGNOSIS — R30.0 DYSURIA: ICD-10-CM

## 2019-11-14 DIAGNOSIS — R35.0 URINARY FREQUENCY: Primary | ICD-10-CM

## 2019-11-14 PROCEDURE — 99243 OFF/OP CNSLTJ NEW/EST LOW 30: CPT | Performed by: NURSE PRACTITIONER

## 2019-11-14 NOTE — PROGRESS NOTES
HPI:    Patient ID: Angel Munoz is a 71year old male. HPI       Patient is a 71year old male who presents to the clinic for a consult at the request of, and a copy of this note will be sent to, Dr. Tali Arora, regarding dysuria.   Past medical hist Dispense Refill   • Ciprofloxacin HCl (CIPRO) 500 MG Oral Tab Take 1 tablet (500 mg total) by mouth 2 (two) times daily for 14 days. 28 tablet 0   • metFORMIN HCl 1000 MG Oral Tab Take 1 tablet (1,000 mg total) by mouth 2 (two) times daily with meals.  [address] [place] [address] Pulmonary/Chest: Effort normal. No respiratory distress. Abdominal: Soft. He exhibits no distension. There is no tenderness. Genitourinary:    Prostate, testes and penis normal.   Right testis shows no mass and no tenderness.  Left testis shows no mas

## 2019-11-30 RX ORDER — LISINOPRIL 10 MG/1
TABLET ORAL
Qty: 90 TABLET | Refills: 1 | Status: SHIPPED | OUTPATIENT
Start: 2019-11-30 | End: 2020-05-20

## 2019-11-30 NOTE — TELEPHONE ENCOUNTER
Refill passed per [facility] protocol.   Hypertensive Medications  Protocol Criteria:  · Appointment scheduled in the past 6 months or in the next 3 months  · BMP or CMP in the past 12 months  · Creatinine result < 2  Recent Outpatient Visits

## 2020-02-26 ENCOUNTER — OFFICE VISIT (OUTPATIENT)
Dept: FAMILY MEDICINE CLINIC | Facility: CLINIC | Age: 70
End: 2020-02-26

## 2020-02-26 VITALS
HEIGHT: 68 IN | HEART RATE: 55 BPM | SYSTOLIC BLOOD PRESSURE: 131 MMHG | TEMPERATURE: 98 F | WEIGHT: 129 LBS | DIASTOLIC BLOOD PRESSURE: 75 MMHG | BODY MASS INDEX: 19.55 KG/M2

## 2020-02-26 DIAGNOSIS — M25.511 ACUTE PAIN OF RIGHT SHOULDER: Primary | ICD-10-CM

## 2020-02-26 PROCEDURE — 99213 OFFICE O/P EST LOW 20 MIN: CPT | Performed by: FAMILY MEDICINE

## 2020-02-27 NOTE — PROGRESS NOTES
HPI:    Patient ID: Erin Rand is a 71year old male.     HPI  Patient presents with:  Shoulder Pain: right shouder slipt in the house  X 2 weeks was taking tylenol       Slipped at his daughters house on kitchen floor 2 weeks ago  Thinks he hit his s Effort normal and breath sounds normal.   Musculoskeletal:         General: No tenderness, deformity or edema. Right shoulder: Normal.              ASSESSMENT/PLAN:   Acute pain of right shoulder  (primary encounter diagnosis)    1.  Acute pain of righ

## 2020-05-20 ENCOUNTER — VIRTUAL PHONE E/M (OUTPATIENT)
Dept: FAMILY MEDICINE CLINIC | Facility: CLINIC | Age: 70
End: 2020-05-20
Payer: MEDICARE

## 2020-05-20 DIAGNOSIS — E11.65 UNCONTROLLED TYPE 2 DIABETES MELLITUS WITH HYPERGLYCEMIA (HCC): ICD-10-CM

## 2020-05-20 DIAGNOSIS — E78.00 HYPERCHOLESTEREMIA: ICD-10-CM

## 2020-05-20 DIAGNOSIS — M25.511 ACUTE PAIN OF RIGHT SHOULDER: Primary | ICD-10-CM

## 2020-05-20 PROCEDURE — 99214 OFFICE O/P EST MOD 30 MIN: CPT | Performed by: FAMILY MEDICINE

## 2020-05-20 RX ORDER — LISINOPRIL 10 MG/1
10 TABLET ORAL DAILY
Qty: 90 TABLET | Refills: 0 | Status: SHIPPED | OUTPATIENT
Start: 2020-05-20 | End: 2020-06-14

## 2020-05-20 NOTE — PROGRESS NOTES
Due to the COVID-19 emergency implementation plan, this patient's visit was converted to a telephone visit as agreed upon with the patient.     Please note that the following visit was completed using two-way, real-time interactive audio and/or video commun Medical History:   Diagnosis Date   • Diabetes [facility])          MEDICATION LIST    Current Outpatient Medications:   •  lisinopril 10 MG Oral Tab, Take 1 tablet (10 mg total) by mouth daily. , Disp: 90 tablet, Rfl: 0  •  insulin detemir (LEVEMIR) 100 UNIT/ML RIGHT (CPT=73030); Future    2. Uncontrolled type 2 diabetes mellitus with hyperglycemia (HCC)    - HEMOGLOBIN A1C; Future  - OPHTHALMOLOGY - INTERNAL  - lisinopril 10 MG Oral Tab; Take 1 tablet (10 mg total) by mouth daily. Dispense: 90 tablet;  Refill: 0

## 2020-05-29 ENCOUNTER — HOSPITAL ENCOUNTER (OUTPATIENT)
Dept: GENERAL RADIOLOGY | Facility: HOSPITAL | Age: 70
Discharge: HOME | End: 2020-05-29
Attending: FAMILY MEDICINE
Payer: COMMERCIAL

## 2020-05-29 ENCOUNTER — APPOINTMENT (OUTPATIENT)
Dept: LAB | Facility: HOSPITAL | Age: 70
End: 2020-05-29
Attending: FAMILY MEDICINE
Payer: COMMERCIAL

## 2020-05-29 DIAGNOSIS — IMO0001 UNCONTROLLED TYPE 2 DIABETES MELLITUS WITHOUT COMPLICATION, WITH LONG-TERM CURRENT USE OF INSULIN: ICD-10-CM

## 2020-05-29 DIAGNOSIS — M25.511 ACUTE PAIN OF RIGHT SHOULDER: ICD-10-CM

## 2020-05-29 DIAGNOSIS — E11.65 UNCONTROLLED TYPE 2 DIABETES MELLITUS WITH HYPERGLYCEMIA (HCC): ICD-10-CM

## 2020-05-29 DIAGNOSIS — E78.00 HYPERCHOLESTEREMIA: ICD-10-CM

## 2020-05-29 DIAGNOSIS — R35.0 URINARY FREQUENCY: ICD-10-CM

## 2020-05-29 PROCEDURE — 36415 COLL VENOUS BLD VENIPUNCTURE: CPT

## 2020-05-29 PROCEDURE — 73030 X-RAY EXAM OF SHOULDER: CPT | Performed by: FAMILY MEDICINE

## 2020-05-29 PROCEDURE — 83036 HEMOGLOBIN GLYCOSYLATED A1C: CPT

## 2020-05-29 PROCEDURE — 80061 LIPID PANEL: CPT

## 2020-05-29 PROCEDURE — 80053 COMPREHEN METABOLIC PANEL: CPT

## 2020-06-02 DIAGNOSIS — E11.65 UNCONTROLLED TYPE 2 DIABETES MELLITUS WITH HYPERGLYCEMIA (HCC): ICD-10-CM

## 2020-06-02 DIAGNOSIS — E11.42 DIABETIC POLYNEUROPATHY ASSOCIATED WITH TYPE 2 DIABETES MELLITUS (HCC): Primary | ICD-10-CM

## 2020-06-14 DIAGNOSIS — E11.65 UNCONTROLLED TYPE 2 DIABETES MELLITUS WITH HYPERGLYCEMIA (HCC): ICD-10-CM

## 2020-06-14 RX ORDER — LISINOPRIL 10 MG/1
TABLET ORAL
Qty: 90 TABLET | Refills: 0 | Status: SHIPPED | OUTPATIENT
Start: 2020-06-14 | End: 2020-12-02

## 2020-09-02 DIAGNOSIS — E11.65 UNCONTROLLED TYPE 2 DIABETES MELLITUS WITH HYPERGLYCEMIA (HCC): ICD-10-CM

## 2020-10-01 ENCOUNTER — TELEPHONE (OUTPATIENT)
Dept: ENDOCRINOLOGY CLINIC | Facility: CLINIC | Age: 70
End: 2020-10-01

## 2020-10-01 NOTE — TELEPHONE ENCOUNTER
Patient called to discuss uncontrolled diabetes. It appears that Dr. Prudy Peabody has already placed referral for endo on 6/2/2020.        Patient verbalizes that he would like to schedule apt with Dr. Guerrero Gonzalez, however he would like to call back and schedule the

## 2020-10-28 ENCOUNTER — TELEPHONE (OUTPATIENT)
Dept: FAMILY MEDICINE CLINIC | Facility: CLINIC | Age: 70
End: 2020-10-28

## 2020-10-28 NOTE — TELEPHONE ENCOUNTER
[facility] [address] [place] [address]) is calling to confirm if Dr. Ndiaye Police received Medicare B Compliant form for patient's testing supplies. [facility] states this form was faxed on 10/20, however, they have not received response.      Fax# [phone]

## 2020-11-05 ENCOUNTER — TELEPHONE (OUTPATIENT)
Dept: FAMILY MEDICINE CLINIC | Facility: CLINIC | Age: 70
End: 2020-11-05

## 2020-11-05 RX ORDER — BLOOD SUGAR DIAGNOSTIC
STRIP MISCELLANEOUS
Qty: 200 STRIP | Refills: 3 | Status: SHIPPED | OUTPATIENT
Start: 2020-11-05 | End: 2021-04-01

## 2020-11-05 RX ORDER — BLOOD-GLUCOSE METER
EACH MISCELLANEOUS
Qty: 1 KIT | Refills: 0 | Status: SHIPPED | OUTPATIENT
Start: 2020-11-05 | End: 2021-04-01

## 2020-11-05 RX ORDER — LANCETS
EACH MISCELLANEOUS
Qty: 200 EACH | Refills: 3 | Status: SHIPPED | OUTPATIENT
Start: 2020-11-05 | End: 2021-04-01

## 2020-11-05 RX ORDER — BLOOD-GLUCOSE METER
EACH MISCELLANEOUS
Qty: 1 KIT | Refills: 0 | Status: SHIPPED | OUTPATIENT
Start: 2020-11-05 | End: 2020-11-05

## 2020-11-05 NOTE — TELEPHONE ENCOUNTER
[id number] requesting 3 separate prescriptions for monitor, test stripes and Fast Clix lancets and will need to list the brand and will need the diagnosis code and if the patient is insulin dependent on each prescription.

## 2020-11-05 NOTE — TELEPHONE ENCOUNTER
Sangeeta Gallardo asking if you received Medicare B compliance forms for diabetic testing supplies and if you will be returning. Faxed to [place] office on 10/28/20 and receipt confirmed by  staff. They will fax again today.

## 2020-11-19 ENCOUNTER — TELEPHONE (OUTPATIENT)
Dept: FAMILY MEDICINE CLINIC | Facility: CLINIC | Age: 70
End: 2020-11-19

## 2020-11-19 DIAGNOSIS — Z12.5 PROSTATE CANCER SCREENING: ICD-10-CM

## 2020-11-19 DIAGNOSIS — E78.00 HYPERCHOLESTEREMIA: Primary | ICD-10-CM

## 2020-11-19 DIAGNOSIS — E11.42 DIABETIC POLYNEUROPATHY ASSOCIATED WITH TYPE 2 DIABETES MELLITUS (HCC): ICD-10-CM

## 2020-11-19 NOTE — TELEPHONE ENCOUNTER
Patient's wife stated her  is requesting general blood works. Please call them once orders are in the system. Thank you.

## 2020-11-19 NOTE — TELEPHONE ENCOUNTER
Dr. Miesha Newman, patient is schedule for a Medicare Px on 12/02/2020. Patient is requesting blood test order for appointment. Please advise.

## 2020-11-25 ENCOUNTER — LAB ENCOUNTER (OUTPATIENT)
Dept: LAB | Age: 70
End: 2020-11-25
Attending: FAMILY MEDICINE
Payer: MEDICARE

## 2020-11-25 DIAGNOSIS — E78.00 HYPERCHOLESTEREMIA: ICD-10-CM

## 2020-11-25 DIAGNOSIS — E11.42 DIABETIC POLYNEUROPATHY ASSOCIATED WITH TYPE 2 DIABETES MELLITUS (HCC): ICD-10-CM

## 2020-11-25 DIAGNOSIS — Z12.5 PROSTATE CANCER SCREENING: ICD-10-CM

## 2020-11-25 PROCEDURE — 82570 ASSAY OF URINE CREATININE: CPT

## 2020-11-25 PROCEDURE — 80061 LIPID PANEL: CPT

## 2020-11-25 PROCEDURE — 80053 COMPREHEN METABOLIC PANEL: CPT

## 2020-11-25 PROCEDURE — 82043 UR ALBUMIN QUANTITATIVE: CPT

## 2020-11-25 PROCEDURE — 83036 HEMOGLOBIN GLYCOSYLATED A1C: CPT

## 2020-11-25 PROCEDURE — 36415 COLL VENOUS BLD VENIPUNCTURE: CPT

## 2020-12-02 ENCOUNTER — OFFICE VISIT (OUTPATIENT)
Dept: FAMILY MEDICINE CLINIC | Facility: CLINIC | Age: 70
End: 2020-12-02
Payer: MEDICARE

## 2020-12-02 VITALS
DIASTOLIC BLOOD PRESSURE: 77 MMHG | BODY MASS INDEX: 19.55 KG/M2 | HEIGHT: 68 IN | WEIGHT: 129 LBS | TEMPERATURE: 98 F | HEART RATE: 74 BPM | SYSTOLIC BLOOD PRESSURE: 128 MMHG | OXYGEN SATURATION: 99 %

## 2020-12-02 DIAGNOSIS — Z12.11 COLON CANCER SCREENING: ICD-10-CM

## 2020-12-02 DIAGNOSIS — H25.13 AGE-RELATED NUCLEAR CATARACT OF BOTH EYES: ICD-10-CM

## 2020-12-02 DIAGNOSIS — E53.8 B12 DEFICIENCY: ICD-10-CM

## 2020-12-02 DIAGNOSIS — E11.65 UNCONTROLLED TYPE 2 DIABETES MELLITUS WITH HYPERGLYCEMIA (HCC): ICD-10-CM

## 2020-12-02 DIAGNOSIS — E78.00 HYPERCHOLESTEREMIA: ICD-10-CM

## 2020-12-02 DIAGNOSIS — Z00.00 ENCOUNTER FOR ANNUAL HEALTH EXAMINATION: Primary | ICD-10-CM

## 2020-12-02 DIAGNOSIS — Z11.59 NEED FOR HEPATITIS C SCREENING TEST: ICD-10-CM

## 2020-12-02 DIAGNOSIS — Z23 NEED FOR VACCINATION: ICD-10-CM

## 2020-12-02 DIAGNOSIS — Z23 FLU VACCINE NEED: ICD-10-CM

## 2020-12-02 DIAGNOSIS — E11.42 DIABETIC POLYNEUROPATHY ASSOCIATED WITH TYPE 2 DIABETES MELLITUS (HCC): ICD-10-CM

## 2020-12-02 PROBLEM — IMO0001 UNCONTROLLED TYPE 2 DIABETES MELLITUS WITHOUT COMPLICATION, WITH LONG-TERM CURRENT USE OF INSULIN: Status: RESOLVED | Noted: 2018-01-04 | Resolved: 2020-12-02

## 2020-12-02 PROCEDURE — G0402 INITIAL PREVENTIVE EXAM: HCPCS | Performed by: FAMILY MEDICINE

## 2020-12-02 PROCEDURE — G0009 ADMIN PNEUMOCOCCAL VACCINE: HCPCS | Performed by: FAMILY MEDICINE

## 2020-12-02 PROCEDURE — 90662 IIV NO PRSV INCREASED AG IM: CPT | Performed by: FAMILY MEDICINE

## 2020-12-02 PROCEDURE — 90670 PCV13 VACCINE IM: CPT | Performed by: FAMILY MEDICINE

## 2020-12-02 PROCEDURE — G0008 ADMIN INFLUENZA VIRUS VAC: HCPCS | Performed by: FAMILY MEDICINE

## 2020-12-02 RX ORDER — LISINOPRIL 10 MG/1
10 TABLET ORAL DAILY
Qty: 90 TABLET | Refills: 3 | Status: SHIPPED | OUTPATIENT
Start: 2020-12-02 | End: 2021-12-18

## 2020-12-02 RX ORDER — INSULIN DETEMIR 100 [IU]/ML
25 INJECTION, SOLUTION SUBCUTANEOUS DAILY
Qty: 2 VIAL | Refills: 0 | Status: SHIPPED | OUTPATIENT
Start: 2020-12-02 | End: 2021-03-22

## 2020-12-02 NOTE — PROGRESS NOTES
HPI:   Arun Downey is a 79year old male who presents for a Medicare Subsequent Annual Wellness visit (Pt already had Initial Annual Wellness). Overall patient states he is doing well. He is walking regularly.     He is afraid to schedule appoint visual field, bilateral     Diabetic polyneuropathy associated with type 2 diabetes mellitus ([facility] [address]    Wt Readings from Last 3 Encounters:  12/02/20 : 129 lb (58.5 kg)  02/26/20 : 129 lb (58.5 kg)  11/05/19 : 130 lb (59 kg)     Last Cholesterol Labs:   Lab ([facility] [address] He  has a past surgical history that includes other surgical history. His family history includes Diabetes in his father; Lipids in his brother. SOCIAL HISTORY:   He  reports that he has never smoked.  He has never used smokeless tobacco. He Corrected Right Eye Chart Acuity: 20/20   Left Eye Visual Acuity: Corrected Left Eye Chart Acuity: 20/25   Both Eyes Visual Acuity: Corrected Both Eyes Chart Acuity: 20/15   Able To Tolerate Visual Acuity: Yes      General Appearance:  Alert, cooperative, FREE    Need for vaccination  -     PNEUMOCOCCAL VACC, 13 PATTIE IM    Need for hepatitis C screening test  -     HCV ANTIBODY;  Future    Hypercholesteremia    B12 deficiency    Diabetic polyneuropathy associated with type 2 diabetes mellitus ([facility]  -     OPH daily.  Dispense: 90 tablet; Refill: 3  - metFORMIN HCl 1000 MG Oral Tab; Take 1 tablet (1,000 mg total) by mouth 2 (two) times daily with meals. Dispense: 180 tablet; Refill: 1    8. Age-related nuclear cataract of both eyes  Follow up opthalmology    9. Annually LDL Cholesterol (mg/dL)   Date Value   11/25/2020 106 (H)        EKG - w/ Initial Preventative Physical Exam only, or if medically necessary Electrocardiogram date    Colorectal Cancer Screening      Colonoscopy Screen every 10 years Colonoscopy d of Persistent     Medications (ACE/ARB, digoxin diuretics, anticonvulsants.)    Potassium  Annually Potassium (mmol/L)   Date Value   11/25/2020 4.4    No flowsheet data found.     Creatinine  Annually Creatinine (mg/dL)   Date Value   11/25/2020 1.19    No

## 2020-12-02 NOTE — PATIENT INSTRUCTIONS
Luzmaria Phelan's SCREENING SCHEDULE   Tests on this list are recommended by your physician but may not be covered, or covered at this frequency, by your insurer. Please check with your insurance carrier before scheduling to verify coverage.     Debora Parrish years- more often if abnormal Colonoscopy due on 11/13/2000 Update [facility] if applicable    Flex Sigmoidoscopy Screen  Covered every 5 years No results found for this or any previous visit. No flowsheet data found.      Fecal Occult Blood   Cover Tetanus Toxoid- Only covered with a cut with metal- TD and TDaP Not covered by Medicare Part B) No orders found for this or any previous visit.  This may be covered with your prescription benefits, but Medicare does not cover unless Medically needed    [employer]ost

## 2021-02-22 RX ORDER — DEXAMETHASONE SODIUM PHOSPHATE 4 MG/ML
INJECTION, SOLUTION INTRAMUSCULAR; INTRAVENOUS
Qty: 100 EACH | Refills: 1 | Status: SHIPPED | OUTPATIENT
Start: 2021-02-22 | End: 2021-08-20

## 2021-02-27 ENCOUNTER — NURSE TRIAGE (OUTPATIENT)
Dept: FAMILY MEDICINE CLINIC | Facility: CLINIC | Age: 71
End: 2021-02-27

## 2021-02-27 DIAGNOSIS — R30.0 BURNING WITH URINATION: Primary | ICD-10-CM

## 2021-02-27 NOTE — TELEPHONE ENCOUNTER
Patient was called, advised lab orders were placed.      G[facility]

## 2021-02-27 NOTE — TELEPHONE ENCOUNTER
Action Requested: Summary for Provider     []  Critical Lab, Recommendations Needed  [x] Need Additional Advice  []   FYI    [x]   Need Orders  [] Need Medications Sent to Pharmacy  []  Other     SUMMARY: Patient states he needs to have his urine checked b

## 2021-02-27 NOTE — TELEPHONE ENCOUNTER
Orders have been placed. I have copied Dr. Prudy Peabody to the orders as well. Should drink plenty of water and try drinking cranberry juice as well.

## 2021-02-28 ENCOUNTER — LAB ENCOUNTER (OUTPATIENT)
Dept: LAB | Facility: HOSPITAL | Age: 71
End: 2021-02-28
Attending: PHYSICIAN ASSISTANT
Payer: MEDICARE

## 2021-02-28 DIAGNOSIS — R30.0 BURNING WITH URINATION: ICD-10-CM

## 2021-02-28 DIAGNOSIS — Z11.59 NEED FOR HEPATITIS C SCREENING TEST: ICD-10-CM

## 2021-02-28 DIAGNOSIS — E53.8 B12 DEFICIENCY: ICD-10-CM

## 2021-02-28 LAB
BILIRUB UR QL: NEGATIVE
CLARITY UR: CLEAR
COLOR UR: YELLOW
GLUCOSE UR-MCNC: NEGATIVE MG/DL
HCV AB SERPL QL IA: NONREACTIVE
KETONES UR-MCNC: NEGATIVE MG/DL
LEUKOCYTE ESTERASE UR QL STRIP.AUTO: NEGATIVE
NITRITE UR QL STRIP.AUTO: NEGATIVE
PH UR: 6 [PH] (ref 5–8)
PROT UR-MCNC: NEGATIVE MG/DL
SP GR UR STRIP: 1.01 (ref 1–1.03)
UROBILINOGEN UR STRIP-ACNC: <2
VIT B12 SERPL-MCNC: 431 PG/ML (ref 193–986)

## 2021-02-28 PROCEDURE — 81001 URINALYSIS AUTO W/SCOPE: CPT

## 2021-02-28 PROCEDURE — 36415 COLL VENOUS BLD VENIPUNCTURE: CPT

## 2021-02-28 PROCEDURE — 86803 HEPATITIS C AB TEST: CPT

## 2021-02-28 PROCEDURE — 87086 URINE CULTURE/COLONY COUNT: CPT

## 2021-02-28 PROCEDURE — 82607 VITAMIN B-12: CPT

## 2021-03-01 ENCOUNTER — OFFICE VISIT (OUTPATIENT)
Dept: FAMILY MEDICINE CLINIC | Facility: CLINIC | Age: 71
End: 2021-03-01
Payer: MEDICARE

## 2021-03-01 ENCOUNTER — LAB ENCOUNTER (OUTPATIENT)
Dept: LAB | Age: 71
End: 2021-03-01
Attending: FAMILY MEDICINE
Payer: MEDICARE

## 2021-03-01 VITALS
SYSTOLIC BLOOD PRESSURE: 128 MMHG | DIASTOLIC BLOOD PRESSURE: 72 MMHG | BODY MASS INDEX: 19.4 KG/M2 | WEIGHT: 128 LBS | TEMPERATURE: 97 F | HEIGHT: 68 IN | HEART RATE: 52 BPM

## 2021-03-01 DIAGNOSIS — R30.0 DYSURIA: Primary | ICD-10-CM

## 2021-03-01 DIAGNOSIS — E11.65 UNCONTROLLED TYPE 2 DIABETES MELLITUS WITH HYPERGLYCEMIA (HCC): ICD-10-CM

## 2021-03-01 LAB
ALBUMIN SERPL-MCNC: 3.8 G/DL (ref 3.4–5)
ALBUMIN/GLOB SERPL: 1.2 {RATIO} (ref 1–2)
ALP LIVER SERPL-CCNC: 80 U/L
ALT SERPL-CCNC: 24 U/L
ANION GAP SERPL CALC-SCNC: 4 MMOL/L (ref 0–18)
AST SERPL-CCNC: 18 U/L (ref 15–37)
BILIRUB SERPL-MCNC: 1 MG/DL (ref 0.1–2)
BUN BLD-MCNC: 17 MG/DL (ref 7–18)
BUN/CREAT SERPL: 15.2 (ref 10–20)
CALCIUM BLD-MCNC: 9 MG/DL (ref 8.5–10.1)
CHLORIDE SERPL-SCNC: 107 MMOL/L (ref 98–112)
CO2 SERPL-SCNC: 30 MMOL/L (ref 21–32)
CREAT BLD-MCNC: 1.12 MG/DL
EST. AVERAGE GLUCOSE BLD GHB EST-MCNC: 180 MG/DL (ref 68–126)
GLOBULIN PLAS-MCNC: 3.3 G/DL (ref 2.8–4.4)
GLUCOSE BLD-MCNC: 227 MG/DL (ref 70–99)
HBA1C MFR BLD HPLC: 7.9 % (ref ?–5.7)
M PROTEIN MFR SERPL ELPH: 7.1 G/DL (ref 6.4–8.2)
OSMOLALITY SERPL CALC.SUM OF ELEC: 301 MOSM/KG (ref 275–295)
PATIENT FASTING Y/N/NP: NO
POTASSIUM SERPL-SCNC: 4.8 MMOL/L (ref 3.5–5.1)
SODIUM SERPL-SCNC: 141 MMOL/L (ref 136–145)

## 2021-03-01 PROCEDURE — 83036 HEMOGLOBIN GLYCOSYLATED A1C: CPT

## 2021-03-01 PROCEDURE — 99213 OFFICE O/P EST LOW 20 MIN: CPT | Performed by: FAMILY MEDICINE

## 2021-03-01 PROCEDURE — 80053 COMPREHEN METABOLIC PANEL: CPT

## 2021-03-01 PROCEDURE — 36415 COLL VENOUS BLD VENIPUNCTURE: CPT

## 2021-03-01 RX ORDER — CIPROFLOXACIN 500 MG/1
500 TABLET, FILM COATED ORAL 2 TIMES DAILY
Qty: 20 TABLET | Refills: 0 | Status: SHIPPED | OUTPATIENT
Start: 2021-03-01 | End: 2021-03-11

## 2021-03-01 NOTE — PROGRESS NOTES
HPI:    Patient ID: Maribeth Burch is a 79year old male. HPI  Patient presents with:  Burning On Urination: X 1 week   Lab Results: follow up     Patient had urine test yesterday  Urinalysis shows small blood, no rbcs    Urine culture negative.     Fe MICROLET LANCETS Does not apply Misc Check sugars twice a day dx uncontrolled Diabetes mellitus 100 each 3   • aspirin 81 MG Oral Tab Take 81 mg by mouth daily.      • Pantoprazole Sodium 40 MG Oral Tab EC Take 1 tablet (40 mg total) by mouth every morning

## 2021-03-05 DIAGNOSIS — E11.65 UNCONTROLLED TYPE 2 DIABETES MELLITUS WITH HYPERGLYCEMIA (HCC): Primary | ICD-10-CM

## 2021-03-12 DIAGNOSIS — Z23 NEED FOR VACCINATION: ICD-10-CM

## 2021-03-22 ENCOUNTER — OFFICE VISIT (OUTPATIENT)
Dept: SURGERY | Facility: CLINIC | Age: 71
End: 2021-03-22
Payer: MEDICARE

## 2021-03-22 VITALS
BODY MASS INDEX: 20 KG/M2 | HEART RATE: 77 BPM | SYSTOLIC BLOOD PRESSURE: 158 MMHG | DIASTOLIC BLOOD PRESSURE: 72 MMHG | WEIGHT: 130 LBS

## 2021-03-22 DIAGNOSIS — R35.0 URINARY FREQUENCY: ICD-10-CM

## 2021-03-22 DIAGNOSIS — E11.42 DIABETIC POLYNEUROPATHY ASSOCIATED WITH TYPE 2 DIABETES MELLITUS (HCC): ICD-10-CM

## 2021-03-22 DIAGNOSIS — R30.0 DYSURIA: Primary | ICD-10-CM

## 2021-03-22 DIAGNOSIS — E11.65 UNCONTROLLED TYPE 2 DIABETES MELLITUS WITH HYPERGLYCEMIA (HCC): ICD-10-CM

## 2021-03-22 PROCEDURE — 99204 OFFICE O/P NEW MOD 45 MIN: CPT | Performed by: UROLOGY

## 2021-03-22 RX ORDER — INSULIN DETEMIR 100 [IU]/ML
25 INJECTION, SOLUTION SUBCUTANEOUS DAILY
Qty: 2 VIAL | Refills: 0 | Status: SHIPPED | OUTPATIENT
Start: 2021-03-22 | End: 2021-06-24

## 2021-03-22 NOTE — TELEPHONE ENCOUNTER
Current Outpatient Medications:   •  insulin detemir (LEVEMIR) 100 UNIT/ML Subcutaneous Solution, Inject 25 Units into the skin daily.  Inject 25IU daily, Disp: 2 vial, Rfl: 0  •

## 2021-03-22 NOTE — PROGRESS NOTES
SUBJECTIVE:  Mercy Shepherd is a 79year old male who presents for a consultation at the request of, and a copy of this note will be sent to, [facility] for evaluation of  benign prostatic hyperplasia and dysuria.  He states that the problem is much distress. Alert and oriented times three, pleasant and cooperative.   CARDIOVASCULAR:normal apical impulse  RESPIRATORY: no chest wall deformities or tenderness  ABDOMEN: abdomen is soft without significant tenderness, masses, organomegaly or guarding  GEN

## 2021-04-01 ENCOUNTER — TELEPHONE (OUTPATIENT)
Dept: FAMILY MEDICINE CLINIC | Facility: CLINIC | Age: 71
End: 2021-04-01

## 2021-04-01 RX ORDER — LANCETS
EACH MISCELLANEOUS
Qty: 200 EACH | Refills: 3 | Status: SHIPPED | OUTPATIENT
Start: 2021-04-01

## 2021-04-01 RX ORDER — BLOOD-GLUCOSE METER
1 EACH MISCELLANEOUS 2 TIMES DAILY
Qty: 1 KIT | Refills: 0 | Status: SHIPPED | OUTPATIENT
Start: 2021-04-01

## 2021-04-01 RX ORDER — BLOOD SUGAR DIAGNOSTIC
STRIP MISCELLANEOUS
Qty: 200 STRIP | Refills: 3 | Status: SHIPPED | OUTPATIENT
Start: 2021-04-01

## 2021-04-01 NOTE — TELEPHONE ENCOUNTER
Diabetes Medications  Protocol Criteria:  · Appointment scheduled in the past 6 months or the next 3 months  · A1C < 7.5 in the past 6 months  · Creatinine in the past 12 months  · Creatinine result < 1.5   Recent Outpatient Visits            1 week ago Dy

## 2021-04-01 NOTE — TELEPHONE ENCOUNTER
Rx: Blood Glucose Monitor : Contour Next  Qty: 1  Sig: Use to Test blood sugar 2 times daily    Rx: Test Strips Contour Next  Directions: Test blood glucose 2 times daily  Qty: 200    Rx: Lancets: Microlet  Directions: Test blood glucose 2 times daily  Qty

## 2021-05-04 ENCOUNTER — OFFICE VISIT (OUTPATIENT)
Dept: SURGERY | Facility: CLINIC | Age: 71
End: 2021-05-04
Payer: MEDICARE

## 2021-05-04 DIAGNOSIS — R30.0 DYSURIA: Primary | ICD-10-CM

## 2021-05-04 DIAGNOSIS — R35.0 URINARY FREQUENCY: ICD-10-CM

## 2021-05-04 PROCEDURE — 99213 OFFICE O/P EST LOW 20 MIN: CPT | Performed by: UROLOGY

## 2021-05-04 PROCEDURE — 51798 US URINE CAPACITY MEASURE: CPT | Performed by: UROLOGY

## 2021-05-04 PROCEDURE — 51741 ELECTRO-UROFLOWMETRY FIRST: CPT | Performed by: UROLOGY

## 2021-05-04 RX ORDER — SILDENAFIL 100 MG/1
100 TABLET, FILM COATED ORAL
Qty: 6 TABLET | Refills: 11 | Status: SHIPPED | OUTPATIENT
Start: 2021-05-04

## 2021-05-04 NOTE — PROGRESS NOTES
Jed Leiva is a 79year old male.     HPI:   Patient presents with:  Urinary Symptoms: uroflow bladder ultrasound     40-year-old male in follow-up to visit March 22, 2021 for evaluation of BPH symptoms and dysuria which appear to have improved after Inject 25 Units into the skin daily.  Inject 25IU daily 2 vial 0   • Insulin Syringe-Needle U-100 (SURE COMFORT INSULIN SYRINGE) 30G X 1/2\" 0.5 ML Does not apply Misc USE ONCE DAILY 100 each 1   • lisinopril 10 MG Oral Tab Take 1 tablet (10 mg total) by mo

## 2021-05-27 DIAGNOSIS — E11.65 UNCONTROLLED TYPE 2 DIABETES MELLITUS WITH HYPERGLYCEMIA (HCC): ICD-10-CM

## 2021-05-27 DIAGNOSIS — E11.42 DIABETIC POLYNEUROPATHY ASSOCIATED WITH TYPE 2 DIABETES MELLITUS (HCC): ICD-10-CM

## 2021-06-07 DIAGNOSIS — E11.42 DIABETIC POLYNEUROPATHY ASSOCIATED WITH TYPE 2 DIABETES MELLITUS (HCC): ICD-10-CM

## 2021-06-07 DIAGNOSIS — E11.65 UNCONTROLLED TYPE 2 DIABETES MELLITUS WITH HYPERGLYCEMIA (HCC): ICD-10-CM

## 2021-06-07 RX ORDER — INSULIN DETEMIR 100 [IU]/ML
INJECTION, SOLUTION SUBCUTANEOUS
Qty: 20 ML | Refills: 0 | OUTPATIENT
Start: 2021-06-07

## 2021-06-22 DIAGNOSIS — E11.42 DIABETIC POLYNEUROPATHY ASSOCIATED WITH TYPE 2 DIABETES MELLITUS (HCC): ICD-10-CM

## 2021-06-22 DIAGNOSIS — E11.65 UNCONTROLLED TYPE 2 DIABETES MELLITUS WITH HYPERGLYCEMIA (HCC): ICD-10-CM

## 2021-06-23 RX ORDER — INSULIN DETEMIR 100 [IU]/ML
INJECTION, SOLUTION SUBCUTANEOUS
Qty: 20 ML | Refills: 0 | OUTPATIENT
Start: 2021-06-23

## 2021-06-24 RX ORDER — INSULIN DETEMIR 100 [IU]/ML
25 INJECTION, SOLUTION SUBCUTANEOUS DAILY
Qty: 10 ML | Refills: 0 | Status: SHIPPED | OUTPATIENT
Start: 2021-06-24 | End: 2021-08-05

## 2021-06-24 NOTE — TELEPHONE ENCOUNTER
Spoke to patient and he will call to make an appointment with the endocrinologist but will need a refill in the mean time because he will be out of insulin.  A 30 day supply has been pended for approval if appropriate

## 2021-08-03 DIAGNOSIS — E11.65 UNCONTROLLED TYPE 2 DIABETES MELLITUS WITH HYPERGLYCEMIA (HCC): ICD-10-CM

## 2021-08-03 DIAGNOSIS — E11.42 DIABETIC POLYNEUROPATHY ASSOCIATED WITH TYPE 2 DIABETES MELLITUS (HCC): ICD-10-CM

## 2021-08-03 RX ORDER — INSULIN DETEMIR 100 [IU]/ML
25 INJECTION, SOLUTION SUBCUTANEOUS DAILY
Qty: 10 ML | Status: CANCELLED | OUTPATIENT
Start: 2021-08-03

## 2021-08-03 NOTE — TELEPHONE ENCOUNTER
Patient is requesting refill of medication insulin detemir (LEVEMIR) 100 UNIT/ML Subcutaneous Solution. Patient states he is out of the script. Patient is requesting that medication be sent to [address] [place] [address] in [facility].

## 2021-08-05 ENCOUNTER — TELEMEDICINE (OUTPATIENT)
Dept: FAMILY MEDICINE CLINIC | Facility: CLINIC | Age: 71
End: 2021-08-05

## 2021-08-05 VITALS — BODY MASS INDEX: 20 KG/M2 | WEIGHT: 129 LBS | SYSTOLIC BLOOD PRESSURE: 130 MMHG | DIASTOLIC BLOOD PRESSURE: 90 MMHG

## 2021-08-05 DIAGNOSIS — Z12.11 COLON CANCER SCREENING: ICD-10-CM

## 2021-08-05 DIAGNOSIS — E78.00 HYPERCHOLESTEREMIA: ICD-10-CM

## 2021-08-05 DIAGNOSIS — E11.42 DIABETIC POLYNEUROPATHY ASSOCIATED WITH TYPE 2 DIABETES MELLITUS (HCC): ICD-10-CM

## 2021-08-05 DIAGNOSIS — E11.65 UNCONTROLLED TYPE 2 DIABETES MELLITUS WITH HYPERGLYCEMIA (HCC): Primary | ICD-10-CM

## 2021-08-05 DIAGNOSIS — E53.8 B12 DEFICIENCY: ICD-10-CM

## 2021-08-05 PROCEDURE — 99214 OFFICE O/P EST MOD 30 MIN: CPT | Performed by: FAMILY MEDICINE

## 2021-08-05 RX ORDER — INSULIN DETEMIR 100 [IU]/ML
25 INJECTION, SOLUTION SUBCUTANEOUS DAILY
Qty: 30 ML | Refills: 0 | Status: SHIPPED | OUTPATIENT
Start: 2021-08-05 | End: 2021-11-29

## 2021-08-05 NOTE — PROGRESS NOTES
This visit is conducted using Telemedicine with live, interactive video and audio during this Coronavirus pandemic. Please note that the following visit was completed using two-way, real-time interactive audio and/or video communication.   This has been diabetes mellitus ([facility]     Uncontrolled type 2 diabetes mellitus with hyperglycemia ([facility]      Past Medical History:   Diagnosis Date   • Diabetes ([facility]            MEDICATION LIST    Current Outpatient Medications:   •  insulin detemir (LEVEMIR) 100 UNIT/ alert, well developed, in no acute distress  Not in acute distress, comfortably seated in own residence  Patient was speaking in complete sentences, no increased work of breathing and very coherent and alert on the phone.   Throat/Neck: normal sound to voi symptoms.       Orders Placed This Encounter      Comp Metabolic Panel (14)      Lipid Panel      Hemoglobin A1C      OCCULT BLOOD, FECAL, IMMUNOASSAY      Vitamin B12      Meds This Visit:  Requested Prescriptions     Signed Prescriptions Disp Refills   •

## 2021-08-20 RX ORDER — DEXAMETHASONE SODIUM PHOSPHATE 4 MG/ML
INJECTION, SOLUTION INTRAMUSCULAR; INTRAVENOUS
Qty: 90 EACH | Refills: 3 | Status: SHIPPED | OUTPATIENT
Start: 2021-08-20 | End: 2022-10-05

## 2021-08-20 NOTE — TELEPHONE ENCOUNTER
Refill passed per [facility] protocol.      Requested Prescriptions   Pending Prescriptions Disp Refills    SURE COMFORT INSULIN SYRINGE 30G X 1/2\" 0.5 ML Does not apply Misc [Pharmacy Med Name: Sure Comfort Insulin Syringe/U-100/0.5ml/30g X 1/2\" Mis Jerry]  0     Sig: USE ONCE DAILY        Diabetic Supplies Protocol Passed - 8/19/2021  6:54 PM        Passed - Appointment in past 12 or next 3 months               Future Appointments         Provider Department Appt Notes    In 2 weeks Angela Cordova MD [place] [facility] Endocrinology DM     [facility] DM foot care              Recent Outpatient Visits              2 weeks ago Uncontrolled type 2 diabetes mellitus with hyperglycemia [facility])    [place] Only Mallorca Watkins, [facility], [address] [facility], Rebecca Hussein MD    Telemedicine    3 months ago [address] [facility] for Kiko Ontiveros MD    Office Visit    5 months ago [address]  [facility] [place] Ganser, MD    Office Visit    5 months ago Mouna Lenz MD    Office Visit    8 months ago Encounter for annual health examination    [place] [address], Rebecca Hussein MD    Office Visit

## 2021-08-31 ENCOUNTER — LAB ENCOUNTER (OUTPATIENT)
Dept: LAB | Age: 71
End: 2021-08-31
Attending: FAMILY MEDICINE
Payer: MEDICARE

## 2021-08-31 DIAGNOSIS — E11.65 UNCONTROLLED TYPE 2 DIABETES MELLITUS WITH HYPERGLYCEMIA (HCC): ICD-10-CM

## 2021-08-31 DIAGNOSIS — E78.00 HYPERCHOLESTEREMIA: ICD-10-CM

## 2021-08-31 DIAGNOSIS — E53.8 B12 DEFICIENCY: ICD-10-CM

## 2021-08-31 LAB
ALBUMIN SERPL-MCNC: 4 G/DL (ref 3.4–5)
ALBUMIN/GLOB SERPL: 1.1 {RATIO} (ref 1–2)
ALP LIVER SERPL-CCNC: 71 U/L
ALT SERPL-CCNC: 24 U/L
ANION GAP SERPL CALC-SCNC: 2 MMOL/L (ref 0–18)
AST SERPL-CCNC: 21 U/L (ref 15–37)
BILIRUB SERPL-MCNC: 1.1 MG/DL (ref 0.1–2)
BUN BLD-MCNC: 19 MG/DL (ref 7–18)
CALCIUM BLD-MCNC: 9.2 MG/DL (ref 8.5–10.1)
CHLORIDE SERPL-SCNC: 108 MMOL/L (ref 98–112)
CHOLEST SMN-MCNC: 171 MG/DL (ref ?–200)
CO2 SERPL-SCNC: 30 MMOL/L (ref 21–32)
CREAT BLD-MCNC: 1.13 MG/DL
EST. AVERAGE GLUCOSE BLD GHB EST-MCNC: 166 MG/DL (ref 68–126)
GLOBULIN PLAS-MCNC: 3.5 G/DL (ref 2.8–4.4)
GLUCOSE BLD-MCNC: 90 MG/DL (ref 70–99)
HBA1C MFR BLD HPLC: 7.4 % (ref ?–5.7)
HDLC SERPL-MCNC: 39 MG/DL (ref 40–59)
LDLC SERPL CALC-MCNC: 115 MG/DL (ref ?–100)
M PROTEIN MFR SERPL ELPH: 7.5 G/DL (ref 6.4–8.2)
NONHDLC SERPL-MCNC: 132 MG/DL (ref ?–130)
OSMOLALITY SERPL CALC.SUM OF ELEC: 292 MOSM/KG (ref 275–295)
PATIENT FASTING Y/N/NP: YES
PATIENT FASTING Y/N/NP: YES
POTASSIUM SERPL-SCNC: 4.9 MMOL/L (ref 3.5–5.1)
SODIUM SERPL-SCNC: 140 MMOL/L (ref 136–145)
TRIGL SERPL-MCNC: 89 MG/DL (ref 30–149)
VIT B12 SERPL-MCNC: 410 PG/ML (ref 193–986)
VLDLC SERPL CALC-MCNC: 15 MG/DL (ref 0–30)

## 2021-08-31 PROCEDURE — 80061 LIPID PANEL: CPT

## 2021-08-31 PROCEDURE — 83036 HEMOGLOBIN GLYCOSYLATED A1C: CPT

## 2021-08-31 PROCEDURE — 80053 COMPREHEN METABOLIC PANEL: CPT

## 2021-08-31 PROCEDURE — 36415 COLL VENOUS BLD VENIPUNCTURE: CPT

## 2021-08-31 PROCEDURE — 82607 VITAMIN B-12: CPT

## 2021-09-03 ENCOUNTER — OFFICE VISIT (OUTPATIENT)
Dept: ENDOCRINOLOGY CLINIC | Facility: CLINIC | Age: 71
End: 2021-09-03
Payer: MEDICARE

## 2021-09-03 VITALS
HEIGHT: 68 IN | DIASTOLIC BLOOD PRESSURE: 63 MMHG | BODY MASS INDEX: 19.55 KG/M2 | SYSTOLIC BLOOD PRESSURE: 133 MMHG | HEART RATE: 48 BPM | WEIGHT: 129 LBS

## 2021-09-03 DIAGNOSIS — E78.5 DYSLIPIDEMIA: Primary | ICD-10-CM

## 2021-09-03 DIAGNOSIS — E11.65 TYPE 2 DIABETES MELLITUS WITH HYPERGLYCEMIA, UNSPECIFIED WHETHER LONG TERM INSULIN USE (HCC): ICD-10-CM

## 2021-09-03 LAB
GLUCOSE BLOOD: 173
TEST STRIP LOT #: NORMAL NUMERIC

## 2021-09-03 PROCEDURE — 82947 ASSAY GLUCOSE BLOOD QUANT: CPT | Performed by: INTERNAL MEDICINE

## 2021-09-03 PROCEDURE — 99203 OFFICE O/P NEW LOW 30 MIN: CPT | Performed by: INTERNAL MEDICINE

## 2021-09-03 PROCEDURE — 36416 COLLJ CAPILLARY BLOOD SPEC: CPT | Performed by: INTERNAL MEDICINE

## 2021-09-03 NOTE — H&P
New Patient Visit - Diabetes    CONSULT - Reason for Visit:  Diabetes management. Requesting Physician: Dr. Radha García:  Patient presents with:  Consult: Pt is here to discuss DM. Poc .        HISTORY OF PRESENT ILLNESS:   Drew [place] mellitus 100 each 3   • aspirin 81 MG Oral Tab Take 81 mg by mouth daily.          PAST MEDICAL HISTORY:   Past Medical History:   Diagnosis Date   • [facility] ([facility]        PAST SURGICAL HISTORY:   Past Surgical History:   Procedure Laterality Date   • OTHER BMI: Body mass index is 19.61 kg/m². [place] low, asymptomatic, FU with PCP    General Appearance:  alert, well developed, in no acute distress  Head: Atraumatic  Eyes:  normal conjunctivae, sclera. , normal sclera and normal pupils  Throat/Neck: luann lifestyle modifications including reductions in dietary total and saturated fat, weight loss, aerobic exercise, and eating a diet rich in fruits and vegetables.   B) LDL is high  Work on low fat diet as above  Will recheck in three months  Patient will like

## 2021-09-04 DIAGNOSIS — E78.00 HYPERCHOLESTEREMIA: Primary | ICD-10-CM

## 2021-09-04 RX ORDER — ATORVASTATIN CALCIUM 10 MG/1
10 TABLET, FILM COATED ORAL NIGHTLY
Qty: 90 TABLET | Refills: 1 | Status: SHIPPED | OUTPATIENT
Start: 2021-09-04

## 2021-09-15 ENCOUNTER — OFFICE VISIT (OUTPATIENT)
Dept: PODIATRY CLINIC | Facility: CLINIC | Age: 71
End: 2021-09-15
Payer: MEDICARE

## 2021-09-15 DIAGNOSIS — E11.42 DIABETIC POLYNEUROPATHY ASSOCIATED WITH TYPE 2 DIABETES MELLITUS (HCC): Primary | ICD-10-CM

## 2021-09-15 PROCEDURE — 99203 OFFICE O/P NEW LOW 30 MIN: CPT | Performed by: PODIATRIST

## 2021-09-15 NOTE — PROGRESS NOTES
HPI:    Patient ID: Iggy Viera is a 79year old male. This 71-year-old diabetic presents to the office today having not been seen in over 3 years. He states that he is here for a diabetic foot evaluation. He is accompanied today by his wife.   He s physical exam pulses are noted. There is no evidence of edema nor erythema. Dryness is rather significant hair growth is noted on his toes although diminished.   He has some roughened areas associated with previous injury and surgery particularly to his l

## 2021-09-18 RX ORDER — DEXAMETHASONE SODIUM PHOSPHATE 4 MG/ML
INJECTION, SOLUTION INTRAMUSCULAR; INTRAVENOUS
Refills: 0 | OUTPATIENT
Start: 2021-09-18

## 2021-11-26 DIAGNOSIS — E11.65 UNCONTROLLED TYPE 2 DIABETES MELLITUS WITH HYPERGLYCEMIA (HCC): ICD-10-CM

## 2021-11-26 DIAGNOSIS — E11.42 DIABETIC POLYNEUROPATHY ASSOCIATED WITH TYPE 2 DIABETES MELLITUS (HCC): ICD-10-CM

## 2021-11-28 RX ORDER — INSULIN DETEMIR 100 [IU]/ML
25 INJECTION, SOLUTION SUBCUTANEOUS DAILY
Qty: 30 ML | Refills: 0 | OUTPATIENT
Start: 2021-11-28

## 2021-11-29 DIAGNOSIS — E11.65 UNCONTROLLED TYPE 2 DIABETES MELLITUS WITH HYPERGLYCEMIA (HCC): ICD-10-CM

## 2021-11-29 DIAGNOSIS — E11.42 DIABETIC POLYNEUROPATHY ASSOCIATED WITH TYPE 2 DIABETES MELLITUS (HCC): ICD-10-CM

## 2021-11-29 RX ORDER — INSULIN DETEMIR 100 [IU]/ML
25 INJECTION, SOLUTION SUBCUTANEOUS DAILY
Qty: 30 ML | Refills: 0 | Status: SHIPPED | OUTPATIENT
Start: 2021-11-29

## 2021-11-29 NOTE — TELEPHONE ENCOUNTER
LOV: 09/03/21 RTC 3-4 months    No future appointments. RN called patient and offered to set up an appointment. Per patient, he will call back to set up an appointment. Informed him that he needs to follow up 3-4 months from 09/2021.   He requested for 9

## 2021-11-29 NOTE — TELEPHONE ENCOUNTER
Pt called for refill:       Current Outpatient Medications:     •  insulin detemir (LEVEMIR) 100 UNIT/ML Subcutaneous Solution, Inject 25 Units into the skin daily. , Disp: 30 mL, Rfl: 0

## 2021-12-16 NOTE — TELEPHONE ENCOUNTER
insulin detemir (LEVEMIR) 100 UNIT/ML Subcutaneous Solution 30 mL 0 11/29/2021     Sig - Route: Inject 25 Units into the skin daily. - Subcutaneous    Sent to pharmacy as: Levemir 100 UNIT/ML Subcutaneous Solution (insulin detemir)    E-Prescribing Status:

## 2021-12-18 DIAGNOSIS — E11.65 UNCONTROLLED TYPE 2 DIABETES MELLITUS WITH HYPERGLYCEMIA (HCC): ICD-10-CM

## 2021-12-18 DIAGNOSIS — E11.42 DIABETIC POLYNEUROPATHY ASSOCIATED WITH TYPE 2 DIABETES MELLITUS (HCC): ICD-10-CM

## 2021-12-18 RX ORDER — LISINOPRIL 10 MG/1
TABLET ORAL
Qty: 90 TABLET | Refills: 0 | Status: SHIPPED | OUTPATIENT
Start: 2021-12-18

## 2022-03-07 ENCOUNTER — TELEPHONE (OUTPATIENT)
Dept: FAMILY MEDICINE CLINIC | Facility: CLINIC | Age: 72
End: 2022-03-07

## 2022-03-07 DIAGNOSIS — E11.42 DIABETIC POLYNEUROPATHY ASSOCIATED WITH TYPE 2 DIABETES MELLITUS (HCC): ICD-10-CM

## 2022-03-07 DIAGNOSIS — E11.65 UNCONTROLLED TYPE 2 DIABETES MELLITUS WITH HYPERGLYCEMIA (HCC): ICD-10-CM

## 2022-03-16 ENCOUNTER — OFFICE VISIT (OUTPATIENT)
Dept: FAMILY MEDICINE CLINIC | Facility: CLINIC | Age: 72
End: 2022-03-16
Payer: MEDICARE

## 2022-03-16 VITALS
BODY MASS INDEX: 19.25 KG/M2 | DIASTOLIC BLOOD PRESSURE: 66 MMHG | WEIGHT: 127 LBS | SYSTOLIC BLOOD PRESSURE: 115 MMHG | TEMPERATURE: 98 F | HEART RATE: 53 BPM | HEIGHT: 68 IN

## 2022-03-16 DIAGNOSIS — E11.65 UNCONTROLLED TYPE 2 DIABETES MELLITUS WITH HYPERGLYCEMIA (HCC): Primary | ICD-10-CM

## 2022-03-16 DIAGNOSIS — E78.00 HYPERCHOLESTEREMIA: ICD-10-CM

## 2022-03-16 DIAGNOSIS — Z12.11 COLON CANCER SCREENING: ICD-10-CM

## 2022-03-16 DIAGNOSIS — L84 CALLUS OF FOOT: ICD-10-CM

## 2022-03-16 DIAGNOSIS — R22.32 MASS OF LEFT HAND: ICD-10-CM

## 2022-03-16 DIAGNOSIS — E11.42 DIABETIC POLYNEUROPATHY ASSOCIATED WITH TYPE 2 DIABETES MELLITUS (HCC): ICD-10-CM

## 2022-03-16 DIAGNOSIS — E53.8 B12 DEFICIENCY: ICD-10-CM

## 2022-03-16 PROCEDURE — 99214 OFFICE O/P EST MOD 30 MIN: CPT | Performed by: FAMILY MEDICINE

## 2022-03-16 RX ORDER — LISINOPRIL 10 MG/1
10 TABLET ORAL DAILY
Qty: 90 TABLET | Refills: 3 | Status: SHIPPED | OUTPATIENT
Start: 2022-03-16

## 2022-04-04 ENCOUNTER — LAB ENCOUNTER (OUTPATIENT)
Dept: LAB | Age: 72
End: 2022-04-04
Attending: FAMILY MEDICINE
Payer: MEDICARE

## 2022-04-04 DIAGNOSIS — E11.65 TYPE 2 DIABETES MELLITUS WITH HYPERGLYCEMIA, UNSPECIFIED WHETHER LONG TERM INSULIN USE (HCC): ICD-10-CM

## 2022-04-04 DIAGNOSIS — E53.8 B12 DEFICIENCY: ICD-10-CM

## 2022-04-04 DIAGNOSIS — E11.65 UNCONTROLLED TYPE 2 DIABETES MELLITUS WITH HYPERGLYCEMIA (HCC): ICD-10-CM

## 2022-04-04 DIAGNOSIS — E78.5 DYSLIPIDEMIA: ICD-10-CM

## 2022-04-04 DIAGNOSIS — E78.00 HYPERCHOLESTEREMIA: ICD-10-CM

## 2022-04-04 LAB
ALBUMIN SERPL-MCNC: 3.9 G/DL (ref 3.4–5)
ALBUMIN/GLOB SERPL: 1.2 {RATIO} (ref 1–2)
ALP LIVER SERPL-CCNC: 77 U/L
ALT SERPL-CCNC: 24 U/L
ANION GAP SERPL CALC-SCNC: 6 MMOL/L (ref 0–18)
AST SERPL-CCNC: 18 U/L (ref 15–37)
BASOPHILS # BLD AUTO: 0.07 X10(3) UL (ref 0–0.2)
BASOPHILS NFR BLD AUTO: 1.1 %
BILIRUB SERPL-MCNC: 1 MG/DL (ref 0.1–2)
BUN BLD-MCNC: 16 MG/DL (ref 7–18)
CALCIUM BLD-MCNC: 9.3 MG/DL (ref 8.5–10.1)
CHLORIDE SERPL-SCNC: 106 MMOL/L (ref 98–112)
CHOLEST SERPL-MCNC: 154 MG/DL (ref ?–200)
CO2 SERPL-SCNC: 30 MMOL/L (ref 21–32)
CREAT BLD-MCNC: 1.08 MG/DL
CREAT UR-SCNC: 64.9 MG/DL
EOSINOPHIL # BLD AUTO: 0.25 X10(3) UL (ref 0–0.7)
EOSINOPHIL NFR BLD AUTO: 3.9 %
ERYTHROCYTE [DISTWIDTH] IN BLOOD BY AUTOMATED COUNT: 13 %
EST. AVERAGE GLUCOSE BLD GHB EST-MCNC: 160 MG/DL (ref 68–126)
FASTING PATIENT LIPID ANSWER: YES
FASTING STATUS PATIENT QL REPORTED: YES
GLOBULIN PLAS-MCNC: 3.2 G/DL (ref 2.8–4.4)
GLUCOSE BLD-MCNC: 127 MG/DL (ref 70–99)
HBA1C MFR BLD: 7.2 % (ref ?–5.7)
HCT VFR BLD AUTO: 41.4 %
HDLC SERPL-MCNC: 37 MG/DL (ref 40–59)
HGB BLD-MCNC: 13.3 G/DL
IMM GRANULOCYTES # BLD AUTO: 0.02 X10(3) UL (ref 0–1)
IMM GRANULOCYTES NFR BLD: 0.3 %
LDLC SERPL CALC-MCNC: 95 MG/DL (ref ?–100)
LYMPHOCYTES # BLD AUTO: 2.55 X10(3) UL (ref 1–4)
LYMPHOCYTES NFR BLD AUTO: 39.7 %
MCH RBC QN AUTO: 28.2 PG (ref 26–34)
MCHC RBC AUTO-ENTMCNC: 32.1 G/DL (ref 31–37)
MCV RBC AUTO: 87.7 FL
MICROALBUMIN UR-MCNC: <0.5 MG/DL
MONOCYTES # BLD AUTO: 0.69 X10(3) UL (ref 0.1–1)
MONOCYTES NFR BLD AUTO: 10.7 %
NEUTROPHILS # BLD AUTO: 2.84 X10 (3) UL (ref 1.5–7.7)
NEUTROPHILS # BLD AUTO: 2.84 X10(3) UL (ref 1.5–7.7)
NEUTROPHILS NFR BLD AUTO: 44.3 %
NONHDLC SERPL-MCNC: 117 MG/DL (ref ?–130)
OSMOLALITY SERPL CALC.SUM OF ELEC: 297 MOSM/KG (ref 275–295)
PLATELET # BLD AUTO: 211 10(3)UL (ref 150–450)
POTASSIUM SERPL-SCNC: 4.2 MMOL/L (ref 3.5–5.1)
PROT SERPL-MCNC: 7.1 G/DL (ref 6.4–8.2)
RBC # BLD AUTO: 4.72 X10(6)UL
SODIUM SERPL-SCNC: 142 MMOL/L (ref 136–145)
TRIGL SERPL-MCNC: 124 MG/DL (ref 30–149)
TSI SER-ACNC: 2.37 MIU/ML (ref 0.36–3.74)
VIT B12 SERPL-MCNC: 296 PG/ML (ref 193–986)
VLDLC SERPL CALC-MCNC: 20 MG/DL (ref 0–30)
WBC # BLD AUTO: 6.4 X10(3) UL (ref 4–11)

## 2022-04-04 PROCEDURE — 82607 VITAMIN B-12: CPT

## 2022-04-04 PROCEDURE — 84443 ASSAY THYROID STIM HORMONE: CPT

## 2022-04-04 PROCEDURE — 36415 COLL VENOUS BLD VENIPUNCTURE: CPT

## 2022-04-04 PROCEDURE — 80053 COMPREHEN METABOLIC PANEL: CPT

## 2022-04-04 PROCEDURE — 82043 UR ALBUMIN QUANTITATIVE: CPT

## 2022-04-04 PROCEDURE — 83036 HEMOGLOBIN GLYCOSYLATED A1C: CPT

## 2022-04-04 PROCEDURE — 85025 COMPLETE CBC W/AUTO DIFF WBC: CPT

## 2022-04-04 PROCEDURE — 82570 ASSAY OF URINE CREATININE: CPT

## 2022-04-04 PROCEDURE — 80061 LIPID PANEL: CPT

## 2022-04-04 NOTE — TELEPHONE ENCOUNTER
Pt called in to get refill on medication insulin detemir (LEVEMIR) 100 UNIT/ML Subcutaneous Solution he is running low.  Please follow up

## 2022-04-05 RX ORDER — INSULIN DETEMIR 100 [IU]/ML
25 INJECTION, SOLUTION SUBCUTANEOUS DAILY
Qty: 30 ML | Refills: 0 | Status: SHIPPED | OUTPATIENT
Start: 2022-04-05

## 2022-04-06 ENCOUNTER — OFFICE VISIT (OUTPATIENT)
Dept: ENDOCRINOLOGY CLINIC | Facility: CLINIC | Age: 72
End: 2022-04-06
Payer: MEDICARE

## 2022-04-06 VITALS
HEART RATE: 53 BPM | HEIGHT: 68 IN | SYSTOLIC BLOOD PRESSURE: 122 MMHG | WEIGHT: 129 LBS | DIASTOLIC BLOOD PRESSURE: 66 MMHG | BODY MASS INDEX: 19.55 KG/M2

## 2022-04-06 DIAGNOSIS — E11.69 TYPE 2 DIABETES MELLITUS WITH OTHER SPECIFIED COMPLICATION, WITH LONG-TERM CURRENT USE OF INSULIN (HCC): Primary | ICD-10-CM

## 2022-04-06 DIAGNOSIS — Z79.4 TYPE 2 DIABETES MELLITUS WITH OTHER SPECIFIED COMPLICATION, WITH LONG-TERM CURRENT USE OF INSULIN (HCC): Primary | ICD-10-CM

## 2022-04-06 DIAGNOSIS — E78.5 DYSLIPIDEMIA: ICD-10-CM

## 2022-04-06 PROCEDURE — 99213 OFFICE O/P EST LOW 20 MIN: CPT | Performed by: INTERNAL MEDICINE

## 2022-08-07 DIAGNOSIS — E11.42 DIABETIC POLYNEUROPATHY ASSOCIATED WITH TYPE 2 DIABETES MELLITUS (HCC): ICD-10-CM

## 2022-08-07 DIAGNOSIS — E11.65 UNCONTROLLED TYPE 2 DIABETES MELLITUS WITH HYPERGLYCEMIA (HCC): ICD-10-CM

## 2022-08-07 RX ORDER — INSULIN DETEMIR 100 [IU]/ML
25 INJECTION, SOLUTION SUBCUTANEOUS DAILY
Qty: 30 ML | Refills: 0 | Status: SHIPPED | OUTPATIENT
Start: 2022-08-07

## 2022-08-17 ENCOUNTER — HOSPITAL ENCOUNTER (OUTPATIENT)
Dept: GENERAL RADIOLOGY | Age: 72
Discharge: HOME | End: 2022-08-17
Attending: FAMILY MEDICINE
Payer: MEDICARE

## 2022-08-17 DIAGNOSIS — R22.32 MASS OF LEFT HAND: ICD-10-CM

## 2022-08-17 PROCEDURE — 73130 X-RAY EXAM OF HAND: CPT | Performed by: FAMILY MEDICINE

## 2022-09-29 DIAGNOSIS — E11.69 TYPE 2 DIABETES MELLITUS WITH OTHER SPECIFIED COMPLICATION, WITH LONG-TERM CURRENT USE OF INSULIN (HCC): ICD-10-CM

## 2022-09-29 DIAGNOSIS — Z79.4 TYPE 2 DIABETES MELLITUS WITH OTHER SPECIFIED COMPLICATION, WITH LONG-TERM CURRENT USE OF INSULIN (HCC): ICD-10-CM

## 2022-10-05 RX ORDER — PEN NEEDLE, DIABETIC 29 G X1/2"
NEEDLE, DISPOSABLE MISCELLANEOUS
Qty: 100 EACH | Refills: 1 | Status: SHIPPED | OUTPATIENT
Start: 2022-10-05 | End: 2023-05-10

## 2022-10-06 NOTE — TELEPHONE ENCOUNTER
Refill passed per [place] [facility].      Requested Prescriptions   Pending Prescriptions Disp Refills    BD INSULIN SYRINGE U/F 30G X 1/2\" 0.5 ML Does not apply Misc [Pharmacy Med Name: Bd Insulin Syringe Ultra-Fine/0.5ml/30g X 12.7mm 30 Mis Bd D]  0     Sig: USE ONCE DAILY        Diabetic Supplies Protocol Passed - 10/5/2022  5:05 PM        Passed - In person appointment or virtual visit in the past 12 mos or appointment in next 3 mos       Recent Outpatient Visits              6 months ago Type 2 diabetes mellitus with other specified complication, with long-term current use of insulin [facility])    [facility] Endocrinology Salbador Mancera MD    Office Visit    6 months ago Uncontrolled type 2 diabetes mellitus with hyperglycemia [facility])    [facility], Andree Mai MD    Office Visit    1 year ago Diabetic polyneuropathy associated with type 2 diabetes mellitus [facility])    [facility], [address], [place] Rufina Andrew DPM    Office Visit    1 year ago Dyslipidemia    [facility] Endocrinology Salbador Mancera MD    Office Visit    1 year ago Uncontrolled type 2 diabetes mellitus with hyperglycemia [facility])    Shukri Cheung MD    Telemedicine     Future Appointments         Provider Department Appt Notes    In 4 weeks Salbador Mancera, [address] Endocrinology diabetes                       Recent Outpatient Visits              6 months ago Type 2 diabetes mellitus with other specified complication, with long-term current use of insulin [facility])    [facility] Endocrinology Salbador Mancera MD    Office Visit    6 months ago Uncontrolled type 2 diabetes mellitus with hyperglycemia [facility])    Shukri Cheung MD    Office Visit    1 year ago Diabetic polyneuropathy associated with type 2 diabetes mellitus [facility])    [facility] for [address] Paulette Westfall Lujean Lobo, DPM    Office Visit    1 year ago Dyslipidemia    [facility], [facility] Endocrinology Nestor Parada MD    Office Visit    1 year ago Uncontrolled type 2 diabetes mellitus with hyperglycemia [facility])    [facility], [facility], [address] [facility] HaakonNae mendez MD    Telemedicine             Future Appointments         Provider Department Appt Notes    In 4 weeks Nestor Parada, [address] [facility] Endocrinology diabetes

## 2022-11-02 ENCOUNTER — OFFICE VISIT (OUTPATIENT)
Dept: ENDOCRINOLOGY CLINIC | Facility: CLINIC | Age: 72
End: 2022-11-02
Payer: MEDICARE

## 2022-11-02 VITALS
DIASTOLIC BLOOD PRESSURE: 70 MMHG | SYSTOLIC BLOOD PRESSURE: 128 MMHG | HEART RATE: 58 BPM | WEIGHT: 129 LBS | BODY MASS INDEX: 20 KG/M2

## 2022-11-02 DIAGNOSIS — E11.69 TYPE 2 DIABETES MELLITUS WITH OTHER SPECIFIED COMPLICATION, WITH LONG-TERM CURRENT USE OF INSULIN (HCC): Primary | ICD-10-CM

## 2022-11-02 DIAGNOSIS — Z79.4 TYPE 2 DIABETES MELLITUS WITH OTHER SPECIFIED COMPLICATION, WITH LONG-TERM CURRENT USE OF INSULIN (HCC): Primary | ICD-10-CM

## 2022-11-02 DIAGNOSIS — E78.5 DYSLIPIDEMIA: ICD-10-CM

## 2022-11-02 DIAGNOSIS — E11.65 UNCONTROLLED TYPE 2 DIABETES MELLITUS WITH HYPERGLYCEMIA (HCC): ICD-10-CM

## 2022-11-02 DIAGNOSIS — E11.42 DIABETIC POLYNEUROPATHY ASSOCIATED WITH TYPE 2 DIABETES MELLITUS (HCC): ICD-10-CM

## 2022-11-02 LAB
CARTRIDGE LOT#: ABNORMAL NUMERIC
GLUCOSE BLOOD: 261
HEMOGLOBIN A1C: 6.8 % (ref 4.3–5.6)
TEST STRIP LOT #: NORMAL NUMERIC

## 2022-11-02 PROCEDURE — 82947 ASSAY GLUCOSE BLOOD QUANT: CPT | Performed by: INTERNAL MEDICINE

## 2022-11-02 PROCEDURE — 99213 OFFICE O/P EST LOW 20 MIN: CPT | Performed by: INTERNAL MEDICINE

## 2022-11-02 PROCEDURE — 83036 HEMOGLOBIN GLYCOSYLATED A1C: CPT | Performed by: INTERNAL MEDICINE

## 2022-11-02 RX ORDER — INSULIN DETEMIR 100 [IU]/ML
25 INJECTION, SOLUTION SUBCUTANEOUS DAILY
Qty: 30 ML | Refills: 0 | Status: SHIPPED | OUTPATIENT
Start: 2022-11-02

## 2023-03-16 DIAGNOSIS — E11.65 UNCONTROLLED TYPE 2 DIABETES MELLITUS WITH HYPERGLYCEMIA (HCC): ICD-10-CM

## 2023-03-16 DIAGNOSIS — E11.42 DIABETIC POLYNEUROPATHY ASSOCIATED WITH TYPE 2 DIABETES MELLITUS (HCC): ICD-10-CM

## 2023-03-17 RX ORDER — INSULIN DETEMIR 100 [IU]/ML
25 INJECTION, SOLUTION SUBCUTANEOUS DAILY
Qty: 30 ML | Refills: 0 | Status: SHIPPED | OUTPATIENT
Start: 2023-03-17

## 2023-04-25 ENCOUNTER — TELEPHONE (OUTPATIENT)
Dept: FAMILY MEDICINE CLINIC | Facility: CLINIC | Age: 73
End: 2023-04-25

## 2023-04-25 DIAGNOSIS — Z12.5 PROSTATE CANCER SCREENING: ICD-10-CM

## 2023-04-25 DIAGNOSIS — Z12.11 COLON CANCER SCREENING: ICD-10-CM

## 2023-04-25 DIAGNOSIS — E78.00 HYPERCHOLESTEREMIA: ICD-10-CM

## 2023-04-25 DIAGNOSIS — E53.8 B12 DEFICIENCY: ICD-10-CM

## 2023-04-25 DIAGNOSIS — E11.65 UNCONTROLLED TYPE 2 DIABETES MELLITUS WITH HYPERGLYCEMIA (HCC): Primary | ICD-10-CM

## 2023-04-25 NOTE — TELEPHONE ENCOUNTER
Requesting for blood orders to be placed for patient to complete before physical and then review with doctor at physical. Requesting a call once orders have been placed.

## 2023-04-27 NOTE — TELEPHONE ENCOUNTER
Pt informed of message below pt stated he will do the fit test will check if he has the kit at home if not will call us back.

## 2023-04-27 NOTE — TELEPHONE ENCOUNTER
Please inform patient fasting lab orders have been placed. There is also a urine test.  He also needs to complete stool test for colon cancer screening which is yearly.   He has not done this last year either unless he would like to do the colonoscopy

## 2023-05-02 ENCOUNTER — LAB ENCOUNTER (OUTPATIENT)
Dept: LAB | Age: 73
End: 2023-05-02
Attending: FAMILY MEDICINE
Payer: MEDICARE

## 2023-05-02 DIAGNOSIS — E11.65 UNCONTROLLED TYPE 2 DIABETES MELLITUS WITH HYPERGLYCEMIA (HCC): ICD-10-CM

## 2023-05-02 DIAGNOSIS — E53.8 B12 DEFICIENCY: ICD-10-CM

## 2023-05-02 DIAGNOSIS — E78.00 HYPERCHOLESTEREMIA: ICD-10-CM

## 2023-05-02 DIAGNOSIS — Z12.5 PROSTATE CANCER SCREENING: ICD-10-CM

## 2023-05-02 LAB
ALBUMIN SERPL-MCNC: 3.9 G/DL (ref 3.4–5)
ALBUMIN/GLOB SERPL: 1.2 {RATIO} (ref 1–2)
ALP LIVER SERPL-CCNC: 71 U/L
ALT SERPL-CCNC: 30 U/L
ANION GAP SERPL CALC-SCNC: 5 MMOL/L (ref 0–18)
AST SERPL-CCNC: 22 U/L (ref 15–37)
BILIRUB SERPL-MCNC: 1.2 MG/DL (ref 0.1–2)
BILIRUB UR QL STRIP.AUTO: NEGATIVE
BUN BLD-MCNC: 15 MG/DL (ref 7–18)
CALCIUM BLD-MCNC: 9.4 MG/DL (ref 8.5–10.1)
CHLORIDE SERPL-SCNC: 109 MMOL/L (ref 98–112)
CHOLEST SERPL-MCNC: 166 MG/DL (ref ?–200)
CLARITY UR REFRACT.AUTO: CLEAR
CO2 SERPL-SCNC: 24 MMOL/L (ref 21–32)
COLOR UR AUTO: YELLOW
COMPLEXED PSA SERPL-MCNC: 2.73 NG/ML (ref ?–4)
CREAT BLD-MCNC: 1.13 MG/DL
CREAT UR-SCNC: 74.3 MG/DL
EST. AVERAGE GLUCOSE BLD GHB EST-MCNC: 197 MG/DL (ref 68–126)
FASTING PATIENT LIPID ANSWER: YES
FASTING STATUS PATIENT QL REPORTED: YES
GFR SERPLBLD BASED ON 1.73 SQ M-ARVRAT: 69 ML/MIN/1.73M2 (ref 60–?)
GLOBULIN PLAS-MCNC: 3.3 G/DL (ref 2.8–4.4)
GLUCOSE BLD-MCNC: 136 MG/DL (ref 70–99)
GLUCOSE UR STRIP.AUTO-MCNC: NEGATIVE MG/DL
HBA1C MFR BLD: 8.5 % (ref ?–5.7)
HDLC SERPL-MCNC: 40 MG/DL (ref 40–59)
KETONES UR STRIP.AUTO-MCNC: NEGATIVE MG/DL
LDLC SERPL CALC-MCNC: 106 MG/DL (ref ?–100)
LEUKOCYTE ESTERASE UR QL STRIP.AUTO: NEGATIVE
MICROALBUMIN UR-MCNC: 0.78 MG/DL
MICROALBUMIN/CREAT 24H UR-RTO: 10.5 UG/MG (ref ?–30)
NITRITE UR QL STRIP.AUTO: NEGATIVE
NONHDLC SERPL-MCNC: 126 MG/DL (ref ?–130)
OSMOLALITY SERPL CALC.SUM OF ELEC: 289 MOSM/KG (ref 275–295)
PH UR STRIP.AUTO: 6 [PH] (ref 5–8)
POTASSIUM SERPL-SCNC: 4 MMOL/L (ref 3.5–5.1)
PROT SERPL-MCNC: 7.2 G/DL (ref 6.4–8.2)
PROT UR STRIP.AUTO-MCNC: NEGATIVE MG/DL
SODIUM SERPL-SCNC: 138 MMOL/L (ref 136–145)
SP GR UR STRIP.AUTO: 1.01 (ref 1–1.03)
TRIGL SERPL-MCNC: 108 MG/DL (ref 30–149)
UROBILINOGEN UR STRIP.AUTO-MCNC: <2 MG/DL
VIT B12 SERPL-MCNC: 295 PG/ML (ref 193–986)
VLDLC SERPL CALC-MCNC: 18 MG/DL (ref 0–30)

## 2023-05-02 PROCEDURE — 82570 ASSAY OF URINE CREATININE: CPT

## 2023-05-02 PROCEDURE — 82043 UR ALBUMIN QUANTITATIVE: CPT

## 2023-05-02 PROCEDURE — 36415 COLL VENOUS BLD VENIPUNCTURE: CPT

## 2023-05-02 PROCEDURE — 82607 VITAMIN B-12: CPT

## 2023-05-02 PROCEDURE — 83036 HEMOGLOBIN GLYCOSYLATED A1C: CPT

## 2023-05-02 PROCEDURE — 80053 COMPREHEN METABOLIC PANEL: CPT

## 2023-05-02 PROCEDURE — 81001 URINALYSIS AUTO W/SCOPE: CPT

## 2023-05-02 PROCEDURE — 80061 LIPID PANEL: CPT

## 2023-05-04 DIAGNOSIS — E11.42 DIABETIC POLYNEUROPATHY ASSOCIATED WITH TYPE 2 DIABETES MELLITUS (HCC): ICD-10-CM

## 2023-05-04 DIAGNOSIS — E11.65 UNCONTROLLED TYPE 2 DIABETES MELLITUS WITH HYPERGLYCEMIA (HCC): ICD-10-CM

## 2023-05-05 RX ORDER — LISINOPRIL 10 MG/1
10 TABLET ORAL DAILY
Qty: 30 TABLET | Refills: 0 | Status: SHIPPED | OUTPATIENT
Start: 2023-05-05

## 2023-05-05 NOTE — TELEPHONE ENCOUNTER
RN sending 30 day supply, further refills can be discussed at upcoming appointment.      Future Appointments   Date Time Provider Florin Rodriguez   5/9/2023  2:30 PM Karoline Dong MD [facility]   7/7/2023 11:15 AM [facility]riano [facility] Gregorio MONROE

## 2023-05-05 NOTE — TELEPHONE ENCOUNTER
Status Date Visit Type Length Department Provider    [facility] 5/9/2023  2:30 PM MEDICARE ANNUAL WELL VISIT 30 min Cayt Rodriguez MD   Appointment Notes:    px (policy informed)                  Scheduled visit as advised.

## 2023-05-09 ENCOUNTER — LAB ENCOUNTER (OUTPATIENT)
Dept: LAB | Age: 73
End: 2023-05-09
Attending: FAMILY MEDICINE
Payer: MEDICARE

## 2023-05-09 ENCOUNTER — OFFICE VISIT (OUTPATIENT)
Dept: FAMILY MEDICINE CLINIC | Facility: CLINIC | Age: 73
End: 2023-05-09

## 2023-05-09 VITALS
TEMPERATURE: 98 F | BODY MASS INDEX: 19.4 KG/M2 | DIASTOLIC BLOOD PRESSURE: 64 MMHG | HEIGHT: 68 IN | SYSTOLIC BLOOD PRESSURE: 122 MMHG | HEART RATE: 58 BPM | WEIGHT: 128 LBS

## 2023-05-09 DIAGNOSIS — E53.8 B12 DEFICIENCY: ICD-10-CM

## 2023-05-09 DIAGNOSIS — E78.00 HYPERCHOLESTEREMIA: ICD-10-CM

## 2023-05-09 DIAGNOSIS — L84 CALLUS OF FOOT: ICD-10-CM

## 2023-05-09 DIAGNOSIS — E11.65 UNCONTROLLED TYPE 2 DIABETES MELLITUS WITH HYPERGLYCEMIA (HCC): ICD-10-CM

## 2023-05-09 DIAGNOSIS — E11.42 DIABETIC POLYNEUROPATHY ASSOCIATED WITH TYPE 2 DIABETES MELLITUS (HCC): ICD-10-CM

## 2023-05-09 DIAGNOSIS — Z71.89 ADVANCE DIRECTIVE DISCUSSED WITH PATIENT: ICD-10-CM

## 2023-05-09 DIAGNOSIS — Z00.00 ENCOUNTER FOR ANNUAL HEALTH EXAMINATION: Primary | ICD-10-CM

## 2023-05-09 LAB
ATRIAL RATE: 74 BPM
P AXIS: 70 DEGREES
P-R INTERVAL: 182 MS
Q-T INTERVAL: 392 MS
QRS DURATION: 90 MS
QTC CALCULATION (BEZET): 435 MS
R AXIS: 15 DEGREES
T AXIS: 63 DEGREES
VENTRICULAR RATE: 74 BPM

## 2023-05-09 PROCEDURE — 93005 ELECTROCARDIOGRAM TRACING: CPT

## 2023-05-09 PROCEDURE — 99214 OFFICE O/P EST MOD 30 MIN: CPT | Performed by: FAMILY MEDICINE

## 2023-05-09 PROCEDURE — G0438 PPPS, INITIAL VISIT: HCPCS | Performed by: FAMILY MEDICINE

## 2023-05-09 PROCEDURE — 93010 ELECTROCARDIOGRAM REPORT: CPT | Performed by: INTERNAL MEDICINE

## 2023-05-09 PROCEDURE — 1126F AMNT PAIN NOTED NONE PRSNT: CPT | Performed by: FAMILY MEDICINE

## 2023-05-09 RX ORDER — ATORVASTATIN CALCIUM 10 MG/1
10 TABLET, FILM COATED ORAL NIGHTLY
Qty: 90 TABLET | Refills: 3 | Status: SHIPPED | OUTPATIENT
Start: 2023-05-09

## 2023-05-09 RX ORDER — LISINOPRIL 10 MG/1
10 TABLET ORAL DAILY
Qty: 90 TABLET | Refills: 3 | Status: SHIPPED | OUTPATIENT
Start: 2023-05-09

## 2023-05-10 ENCOUNTER — TELEPHONE (OUTPATIENT)
Dept: ENDOCRINOLOGY CLINIC | Facility: CLINIC | Age: 73
End: 2023-05-10

## 2023-05-10 DIAGNOSIS — E11.65 UNCONTROLLED TYPE 2 DIABETES MELLITUS WITH HYPERGLYCEMIA (HCC): Primary | ICD-10-CM

## 2023-05-10 RX ORDER — PEN NEEDLE, DIABETIC 29 G X1/2"
NEEDLE, DISPOSABLE MISCELLANEOUS
Qty: 100 EACH | Refills: 1 | Status: SHIPPED | OUTPATIENT
Start: 2023-05-10

## 2023-06-20 DIAGNOSIS — Z79.4 TYPE 2 DIABETES MELLITUS WITH OTHER SPECIFIED COMPLICATION, WITH LONG-TERM CURRENT USE OF INSULIN (HCC): ICD-10-CM

## 2023-06-20 DIAGNOSIS — E11.69 TYPE 2 DIABETES MELLITUS WITH OTHER SPECIFIED COMPLICATION, WITH LONG-TERM CURRENT USE OF INSULIN (HCC): ICD-10-CM

## 2023-07-03 DIAGNOSIS — E11.65 UNCONTROLLED TYPE 2 DIABETES MELLITUS WITH HYPERGLYCEMIA (HCC): ICD-10-CM

## 2023-07-03 DIAGNOSIS — E11.42 DIABETIC POLYNEUROPATHY ASSOCIATED WITH TYPE 2 DIABETES MELLITUS (HCC): ICD-10-CM

## 2023-07-05 RX ORDER — INSULIN DETEMIR 100 [IU]/ML
25 INJECTION, SOLUTION SUBCUTANEOUS DAILY
Qty: 30 ML | Refills: 0 | Status: SHIPPED | OUTPATIENT
Start: 2023-07-05

## 2023-07-07 ENCOUNTER — OFFICE VISIT (OUTPATIENT)
Dept: ENDOCRINOLOGY CLINIC | Facility: CLINIC | Age: 73
End: 2023-07-07

## 2023-07-07 VITALS
BODY MASS INDEX: 19.72 KG/M2 | SYSTOLIC BLOOD PRESSURE: 131 MMHG | WEIGHT: 130.13 LBS | DIASTOLIC BLOOD PRESSURE: 66 MMHG | HEART RATE: 49 BPM | HEIGHT: 68 IN

## 2023-07-07 DIAGNOSIS — E78.5 DYSLIPIDEMIA: ICD-10-CM

## 2023-07-07 DIAGNOSIS — E11.69 TYPE 2 DIABETES MELLITUS WITH OTHER SPECIFIED COMPLICATION, WITH LONG-TERM CURRENT USE OF INSULIN (HCC): Primary | ICD-10-CM

## 2023-07-07 DIAGNOSIS — L84 CALLUS: ICD-10-CM

## 2023-07-07 DIAGNOSIS — Z79.4 TYPE 2 DIABETES MELLITUS WITH OTHER SPECIFIED COMPLICATION, WITH LONG-TERM CURRENT USE OF INSULIN (HCC): Primary | ICD-10-CM

## 2023-07-07 LAB
CARTRIDGE LOT#: ABNORMAL NUMERIC
GLUCOSE BLOOD: 205
HEMOGLOBIN A1C: 7.2 % (ref 4.3–5.6)
TEST STRIP LOT #: NORMAL NUMERIC

## 2023-07-07 PROCEDURE — 83036 HEMOGLOBIN GLYCOSYLATED A1C: CPT | Performed by: INTERNAL MEDICINE

## 2023-07-07 PROCEDURE — 99213 OFFICE O/P EST LOW 20 MIN: CPT | Performed by: INTERNAL MEDICINE

## 2023-07-07 PROCEDURE — 82947 ASSAY GLUCOSE BLOOD QUANT: CPT | Performed by: INTERNAL MEDICINE

## 2023-08-03 ENCOUNTER — OFFICE VISIT (OUTPATIENT)
Dept: PODIATRY CLINIC | Facility: CLINIC | Age: 73
End: 2023-08-03

## 2023-08-03 DIAGNOSIS — L84 CALLUS OF FOOT: ICD-10-CM

## 2023-08-03 DIAGNOSIS — M77.42 METATARSALGIA OF BOTH FEET: ICD-10-CM

## 2023-08-03 DIAGNOSIS — M77.41 METATARSALGIA OF BOTH FEET: ICD-10-CM

## 2023-08-03 DIAGNOSIS — L90.9 PLANTAR FAT PAD ATROPHY: ICD-10-CM

## 2023-08-03 DIAGNOSIS — E11.42 DIABETIC POLYNEUROPATHY ASSOCIATED WITH TYPE 2 DIABETES MELLITUS (HCC): Primary | ICD-10-CM

## 2023-08-03 PROCEDURE — 99213 OFFICE O/P EST LOW 20 MIN: CPT | Performed by: STUDENT IN AN ORGANIZED HEALTH CARE EDUCATION/TRAINING PROGRAM

## 2023-08-03 PROCEDURE — 1126F AMNT PAIN NOTED NONE PRSNT: CPT | Performed by: STUDENT IN AN ORGANIZED HEALTH CARE EDUCATION/TRAINING PROGRAM

## 2023-08-03 NOTE — PROGRESS NOTES
[facility] Podiatry  Progress Note          HPI:       Christina Gallagher is a 67year old male. Patient presents with:  Diabetic Foot Care: Pt referred by PCP for diabetic foot care. Pt denies any pain  C/o bunion discomfort in both feet. FBS this am was 110. Last A1c was 7.2 on 7/7/23. Pt sustained a left foot injury when he was 36years old, which left him with a deformed left foot. He admits of painful calluses mainly on the bottom of his left 3rd metatarsal. Pt does use an electric pumice stone to help debride the thick callus build up. Allergies: Patient has no known allergies. Current Outpatient Medications   Medication Sig Dispense Refill    insulin detemir (LEVEMIR) 100 UNIT/ML Subcutaneous Solution Inject 25 Units into the skin daily. 30 mL 0    METFORMIN HCL 1000 MG Oral Tab TAKE ONE TABLET BY MOUTH TWICE A DAY WITH MEALS 180 tablet 0    atorvastatin 10 MG Oral Tab Take 1 tablet (10 mg total) by mouth nightly. 90 tablet 3    lisinopril 10 MG Oral Tab Take 1 tablet (10 mg total) by mouth daily. 90 tablet 3    Cyanocobalamin (VITAMIN B12) 1000 MCG Oral Tab CR Take 1 tablet by mouth daily. 90 tablet 3    aspirin 81 MG Oral Tab Take 1 tablet (81 mg total) by mouth daily.         Past Medical History:   Diagnosis Date    Diabetes [facility])       Past Surgical History:   Procedure Laterality Date    OTHER SURGICAL HISTORY      LEFT FOOT CRUSH INJURY AFTER TRAIN DERAILED      Family History   Problem Relation Age of Onset    Diabetes Father     Lipids Brother     Heart Disorder Neg     Hypertension Neg     Retinal detachment Neg     Strabismus Neg     Macular degeneration Neg     Glaucoma Neg     Amblyopia Neg       Social History    Socioeconomic History      Marital status:     Tobacco Use      Smoking status: Never      Smokeless tobacco: Never    Substance and Sexual Activity      Alcohol use: No      Drug use: No          REVIEW OF SYSTEMS:     Denies nause, fever, chills  No calf pain  Denies chest pain or SOB      EXAM:   There were no vitals taken for this visit. GENERAL: well developed, well nourished, in no apparent distress  EXTREMITIES:   1. Integument: Normal skin temperature and turgor. HPK tissue on the 1st metatarsal medial eminence valerie and plantar aspect of 3rd metatarsal   2. Vascular: Dorsalis pedis two out of four bilateral and posterior tibial pulses two out of   four bilateral, capillary refill normal.   3. Musculoskeletal: All muscle groups are graded 5 out of 5 in the foot and ankle. POP to HPK tissue to valerie foot   4. Neurological: Normal sharp dull sensation; reflexes normal.             ASSESSMENT AND PLAN:   Diagnoses and all orders for this visit:    Diabetic polyneuropathy associated with type 2 diabetes mellitus ([facility]    Metatarsalgia of both feet    Plantar fat pad atrophy    Callus of foot        Plan:         -Patient examined, chart history reviewed. -Discussed importance of proper pedal hygiene, regular foot checks, and tight glucose control.  -Sharply pared HPKs to valerie feet using a #15 blade to healthy skin   -Advised pt to obtain OTC inserts and bring them to his next visit for modification.   -Ambulate with supportive shoes and inserts and avoid walking barefoot.  -Educated patient on acute signs of infection advised patient to seek immediate medical attention if symptoms arise. The patient indicates understanding of these issues and agrees to the plan.         Niall Person DPM

## 2023-09-08 ENCOUNTER — OFFICE VISIT (OUTPATIENT)
Dept: PODIATRY CLINIC | Facility: CLINIC | Age: 73
End: 2023-09-08

## 2023-09-08 DIAGNOSIS — L84 CALLUS OF FOOT: Primary | ICD-10-CM

## 2023-09-08 DIAGNOSIS — E11.42 DIABETIC POLYNEUROPATHY ASSOCIATED WITH TYPE 2 DIABETES MELLITUS (HCC): ICD-10-CM

## 2023-09-08 DIAGNOSIS — M77.41 METATARSALGIA OF BOTH FEET: ICD-10-CM

## 2023-09-08 DIAGNOSIS — M77.42 METATARSALGIA OF BOTH FEET: ICD-10-CM

## 2023-09-08 PROCEDURE — 1126F AMNT PAIN NOTED NONE PRSNT: CPT | Performed by: STUDENT IN AN ORGANIZED HEALTH CARE EDUCATION/TRAINING PROGRAM

## 2023-09-08 PROCEDURE — 99213 OFFICE O/P EST LOW 20 MIN: CPT | Performed by: STUDENT IN AN ORGANIZED HEALTH CARE EDUCATION/TRAINING PROGRAM

## 2023-09-08 NOTE — PROGRESS NOTES
[facility] Podiatry  Progress Note      Td Valentine is a 67year old male. Patient presents with:  Callus: Bilateral foot f/u- denies any pain at this time but stated bothersome- FBS this [id number]            HPI:       Patient presents to clinic for bilateral foot evaluation. Patient admits to calluses to bilateral feet that are painful. Allergies: Patient has no known allergies. Current Outpatient Medications   Medication Sig Dispense Refill    insulin detemir (LEVEMIR) 100 UNIT/ML Subcutaneous Solution Inject 25 Units into the skin daily. 30 mL 0    METFORMIN HCL 1000 MG Oral Tab TAKE ONE TABLET BY MOUTH TWICE A DAY WITH MEALS 180 tablet 0    atorvastatin 10 MG Oral Tab Take 1 tablet (10 mg total) by mouth nightly. 90 tablet 3    lisinopril 10 MG Oral Tab Take 1 tablet (10 mg total) by mouth daily. 90 tablet 3    aspirin 81 MG Oral Tab Take 1 tablet (81 mg total) by mouth daily. Past Medical History:   Diagnosis Date    Diabetes ([address]       Past Surgical History:   Procedure Laterality Date    OTHER SURGICAL HISTORY      LEFT FOOT CRUSH INJURY AFTER TRAIN DERAILED      Family History   Problem Relation Age of Onset    Diabetes Father     Lipids Brother     Heart Disorder Neg     Hypertension Neg     Retinal detachment Neg     Strabismus Neg     Macular degeneration Neg     Glaucoma Neg     Amblyopia Neg       Social History    Socioeconomic History      Marital status:     Tobacco Use      Smoking status: Never      Smokeless tobacco: Never    Substance and Sexual Activity      Alcohol use: No      Drug use: No          REVIEW OF SYSTEMS:     Denies nause, fever, chills  No calf pain  Denies chest pain or SOB      EXAM:   There were no vitals taken for this visit. GENERAL: well developed, well nourished, in no apparent distress  EXTREMITIES:   1. Integument: Normal skin temperature and turgor.  HPK tissue on the 1st metatarsal medial eminence valerie and plantar aspect of 3rd metatarsal 2. Vascular: Dorsalis pedis two out of four bilateral and posterior tibial pulses two out of   four bilateral, capillary refill normal.   3. Musculoskeletal: All muscle groups are graded 5 out of 5 in the foot and ankle. POP to HPK tissue to valerie foot   4. Neurological: Normal sharp dull sensation; reflexes normal.             ASSESSMENT AND PLAN:   Diagnoses and all orders for this visit:    Callus of foot    Diabetic polyneuropathy associated with type 2 diabetes mellitus ([facility]    Metatarsalgia of both feet        Plan:       -Patient examined, chart history reviewed. -Discussed importance of proper pedal hygiene, regular foot checks, and tight glucose control.  -Sharply pared HPKs to valerie feet using a #15 blade to healthy skin   -Advised pt to obtain shoes with mesh material    -Ambulate with supportive shoes and inserts and avoid walking barefoot.  -Educated patient on acute signs of infection advised patient to seek immediate medical attention if symptoms arise. The patient indicates understanding of these issues and agrees to the plan.         Khoa Bazan DPM

## 2023-09-21 DIAGNOSIS — Z79.4 TYPE 2 DIABETES MELLITUS WITH OTHER SPECIFIED COMPLICATION, WITH LONG-TERM CURRENT USE OF INSULIN (HCC): ICD-10-CM

## 2023-09-21 DIAGNOSIS — E11.69 TYPE 2 DIABETES MELLITUS WITH OTHER SPECIFIED COMPLICATION, WITH LONG-TERM CURRENT USE OF INSULIN (HCC): ICD-10-CM

## 2023-10-30 DIAGNOSIS — E11.42 DIABETIC POLYNEUROPATHY ASSOCIATED WITH TYPE 2 DIABETES MELLITUS (HCC): ICD-10-CM

## 2023-10-30 DIAGNOSIS — E11.65 UNCONTROLLED TYPE 2 DIABETES MELLITUS WITH HYPERGLYCEMIA (HCC): ICD-10-CM

## 2023-10-30 RX ORDER — INSULIN DETEMIR 100 [IU]/ML
25 INJECTION, SOLUTION SUBCUTANEOUS DAILY
Qty: 30 ML | Refills: 0 | Status: SHIPPED | OUTPATIENT
Start: 2023-10-30 | End: 2024-01-18

## 2023-11-10 DIAGNOSIS — E11.65 UNCONTROLLED TYPE 2 DIABETES MELLITUS WITH HYPERGLYCEMIA (HCC): ICD-10-CM

## 2023-11-12 RX ORDER — PEN NEEDLE, DIABETIC 29 G X1/2"
NEEDLE, DISPOSABLE MISCELLANEOUS
Qty: 100 EACH | Refills: 0 | Status: SHIPPED | OUTPATIENT
Start: 2023-11-12

## 2023-11-13 NOTE — TELEPHONE ENCOUNTER
Called and spoke to patient, patient informed me that he would call our office back at a later time.

## 2023-11-21 ENCOUNTER — TELEPHONE (OUTPATIENT)
Dept: FAMILY MEDICINE CLINIC | Facility: CLINIC | Age: 73
End: 2023-11-21

## 2023-11-21 NOTE — TELEPHONE ENCOUNTER
Reminder sent for Patient to schedule Dm eye exam or to fax result via [employer] if completed at another facility.

## 2023-12-14 ENCOUNTER — OFFICE VISIT (OUTPATIENT)
Dept: FAMILY MEDICINE CLINIC | Facility: CLINIC | Age: 73
End: 2023-12-14
Payer: MEDICARE

## 2023-12-14 ENCOUNTER — LAB ENCOUNTER (OUTPATIENT)
Dept: LAB | Age: 73
End: 2023-12-14
Attending: FAMILY MEDICINE
Payer: MEDICARE

## 2023-12-14 VITALS
SYSTOLIC BLOOD PRESSURE: 113 MMHG | DIASTOLIC BLOOD PRESSURE: 66 MMHG | HEART RATE: 53 BPM | WEIGHT: 129 LBS | TEMPERATURE: 98 F | BODY MASS INDEX: 19.55 KG/M2 | HEIGHT: 68 IN

## 2023-12-14 DIAGNOSIS — E11.42 DIABETIC POLYNEUROPATHY ASSOCIATED WITH TYPE 2 DIABETES MELLITUS (HCC): ICD-10-CM

## 2023-12-14 DIAGNOSIS — E11.65 UNCONTROLLED TYPE 2 DIABETES MELLITUS WITH HYPERGLYCEMIA (HCC): Primary | ICD-10-CM

## 2023-12-14 DIAGNOSIS — E53.8 B12 DEFICIENCY: ICD-10-CM

## 2023-12-14 DIAGNOSIS — R12 HEARTBURN: ICD-10-CM

## 2023-12-14 DIAGNOSIS — E78.00 HYPERCHOLESTEREMIA: ICD-10-CM

## 2023-12-14 DIAGNOSIS — E11.65 UNCONTROLLED TYPE 2 DIABETES MELLITUS WITH HYPERGLYCEMIA (HCC): ICD-10-CM

## 2023-12-14 LAB
ALBUMIN SERPL-MCNC: 4.3 G/DL (ref 3.2–4.8)
ALBUMIN/GLOB SERPL: 1.7 {RATIO} (ref 1–2)
ALP LIVER SERPL-CCNC: 67 U/L
ALT SERPL-CCNC: 23 U/L
ANION GAP SERPL CALC-SCNC: 5 MMOL/L (ref 0–18)
AST SERPL-CCNC: 23 U/L (ref ?–34)
BILIRUB SERPL-MCNC: 0.8 MG/DL (ref 0.2–1.1)
BUN BLD-MCNC: 16 MG/DL (ref 9–23)
BUN/CREAT SERPL: 15.2 (ref 10–20)
CALCIUM BLD-MCNC: 9.7 MG/DL (ref 8.7–10.4)
CHLORIDE SERPL-SCNC: 104 MMOL/L (ref 98–112)
CHOLEST SERPL-MCNC: 153 MG/DL (ref ?–200)
CO2 SERPL-SCNC: 31 MMOL/L (ref 21–32)
CREAT BLD-MCNC: 1.05 MG/DL
EGFRCR SERPLBLD CKD-EPI 2021: 75 ML/MIN/1.73M2 (ref 60–?)
EST. AVERAGE GLUCOSE BLD GHB EST-MCNC: 174 MG/DL (ref 68–126)
FASTING PATIENT LIPID ANSWER: YES
FASTING STATUS PATIENT QL REPORTED: YES
GLOBULIN PLAS-MCNC: 2.6 G/DL (ref 2.8–4.4)
GLUCOSE BLD-MCNC: 126 MG/DL (ref 70–99)
HBA1C MFR BLD: 7.7 % (ref ?–5.7)
HDLC SERPL-MCNC: 35 MG/DL (ref 40–59)
LDLC SERPL CALC-MCNC: 96 MG/DL (ref ?–100)
NONHDLC SERPL-MCNC: 118 MG/DL (ref ?–130)
OSMOLALITY SERPL CALC.SUM OF ELEC: 293 MOSM/KG (ref 275–295)
POTASSIUM SERPL-SCNC: 4.4 MMOL/L (ref 3.5–5.1)
PROT SERPL-MCNC: 6.9 G/DL (ref 5.7–8.2)
SODIUM SERPL-SCNC: 140 MMOL/L (ref 136–145)
TRIGL SERPL-MCNC: 119 MG/DL (ref 30–149)
VIT B12 SERPL-MCNC: 400 PG/ML (ref 211–911)
VLDLC SERPL CALC-MCNC: 20 MG/DL (ref 0–30)

## 2023-12-14 PROCEDURE — 36415 COLL VENOUS BLD VENIPUNCTURE: CPT

## 2023-12-14 PROCEDURE — 80053 COMPREHEN METABOLIC PANEL: CPT

## 2023-12-14 PROCEDURE — 80061 LIPID PANEL: CPT

## 2023-12-14 PROCEDURE — 83036 HEMOGLOBIN GLYCOSYLATED A1C: CPT

## 2023-12-14 PROCEDURE — 99214 OFFICE O/P EST MOD 30 MIN: CPT | Performed by: FAMILY MEDICINE

## 2023-12-14 PROCEDURE — 82607 VITAMIN B-12: CPT

## 2023-12-14 RX ORDER — OMEPRAZOLE 20 MG/1
20 CAPSULE, DELAYED RELEASE ORAL
Qty: 30 CAPSULE | Refills: 0 | Status: SHIPPED | OUTPATIENT
Start: 2023-12-14

## 2024-01-12 DIAGNOSIS — E11.69 TYPE 2 DIABETES MELLITUS WITH OTHER SPECIFIED COMPLICATION, WITH LONG-TERM CURRENT USE OF INSULIN (HCC): ICD-10-CM

## 2024-01-12 DIAGNOSIS — Z79.4 TYPE 2 DIABETES MELLITUS WITH OTHER SPECIFIED COMPLICATION, WITH LONG-TERM CURRENT USE OF INSULIN (HCC): ICD-10-CM

## 2024-01-17 ENCOUNTER — TELEPHONE (OUTPATIENT)
Dept: ENDOCRINOLOGY CLINIC | Facility: CLINIC | Age: 74
End: 2024-01-17

## 2024-01-17 NOTE — TELEPHONE ENCOUNTER
Received a fax from [facility] in regards to the Levemir 100. They stated, \"This drug is not covered on our formulary. Alternative medications that are covered include: Toujeo Solostar and Tresiba\". Dr. Bailey please advise if okay to send alternatives.

## 2024-01-18 DIAGNOSIS — E11.42 DIABETIC POLYNEUROPATHY ASSOCIATED WITH TYPE 2 DIABETES MELLITUS (HCC): ICD-10-CM

## 2024-01-18 DIAGNOSIS — E11.65 UNCONTROLLED TYPE 2 DIABETES MELLITUS WITH HYPERGLYCEMIA (HCC): ICD-10-CM

## 2024-01-18 RX ORDER — INSULIN GLARGINE 300 U/ML
24 INJECTION, SOLUTION SUBCUTANEOUS DAILY
Qty: 21 ML | Refills: 0 | Status: SHIPPED | OUTPATIENT
Start: 2024-01-18 | End: 2024-04-17

## 2024-01-19 RX ORDER — INSULIN DETEMIR 100 [IU]/ML
25 INJECTION, SOLUTION SUBCUTANEOUS DAILY
Qty: 30 ML | Refills: 0 | OUTPATIENT
Start: 2024-01-19

## 2024-02-27 ENCOUNTER — OFFICE VISIT (OUTPATIENT)
Dept: PODIATRY CLINIC | Facility: CLINIC | Age: 74
End: 2024-02-27

## 2024-02-27 DIAGNOSIS — L84 CALLUS OF FOOT: ICD-10-CM

## 2024-02-27 DIAGNOSIS — M77.41 METATARSALGIA OF BOTH FEET: ICD-10-CM

## 2024-02-27 DIAGNOSIS — E11.42 DIABETIC POLYNEUROPATHY ASSOCIATED WITH TYPE 2 DIABETES MELLITUS (HCC): Primary | ICD-10-CM

## 2024-02-27 DIAGNOSIS — M77.42 METATARSALGIA OF BOTH FEET: ICD-10-CM

## 2024-02-27 PROCEDURE — 99213 OFFICE O/P EST LOW 20 MIN: CPT | Performed by: STUDENT IN AN ORGANIZED HEALTH CARE EDUCATION/TRAINING PROGRAM

## 2024-02-27 NOTE — PROGRESS NOTES
[facility] Podiatry  Progress Note      Luzmaria Phelan is a 73 year old male.   Chief Complaint   Patient presents with    Callus     5 month follow up- Bilateral. Denies pain right now, but states that it can increase with pressure.              HPI:       Patient presents to clinic for bilateral foot evaluation.  Patient admits to calluses to bilateral feet that are painful. Pt is accompanied by his wife.     Allergies: Patient has no known allergies.    Current Outpatient Medications   Medication Sig Dispense Refill    Insulin Glargine, 2 Unit Dial, (TOUJEO MAX SOLOSTAR) 300 UNIT/ML Subcutaneous Solution Pen-injector Inject 24 Units into the skin daily. 21 mL 0    metFORMIN HCl 1000 MG Oral Tab Take 1 tablet (1,000 mg total) by mouth 2 (two) times daily with meals. 180 tablet 0    omeprazole 20 MG Oral Capsule Delayed Release Take 1 capsule (20 mg total) by mouth every morning before breakfast. 30 capsule 0    Insulin Syringe-Needle U-100 (BD INSULIN SYRINGE U/F) 30G X 1/2\" 0.5 ML Does not apply Misc Use once daily 100 each 0    atorvastatin 10 MG Oral Tab Take 1 tablet (10 mg total) by mouth nightly. 90 tablet 3    lisinopril 10 MG Oral Tab Take 1 tablet (10 mg total) by mouth daily. 90 tablet 3    aspirin 81 MG Oral Tab Take 1 tablet (81 mg total) by mouth daily.        Past Medical History:   Diagnosis Date    Diabetes (HCC)       Past Surgical History:   Procedure Laterality Date    OTHER SURGICAL HISTORY      LEFT FOOT CRUSH INJURY AFTER TRAIN DERAILED      Family History   Problem Relation Age of Onset    Diabetes Father     Lipids Brother     Heart Disorder Neg     Hypertension Neg     Retinal detachment Neg     Strabismus Neg     Macular degeneration Neg     Glaucoma Neg     Amblyopia Neg       Social History     Socioeconomic History    Marital status:    Tobacco Use    Smoking status: Never    Smokeless tobacco: Never   Substance and Sexual Activity    Alcohol use: No    Drug use: No            REVIEW OF SYSTEMS:     Denies nause, fever, chills  No calf pain  Denies chest pain or SOB      EXAM:   There were no vitals taken for this visit.  GENERAL: well developed, well nourished, in no apparent distress  EXTREMITIES:   1. Integument: Normal skin temperature and turgor. HPK tissue on the 1st metatarsal medial eminence valerie and plantar aspect of 3rd metatarsal   2. Vascular: Dorsalis pedis two out of four bilateral and posterior tibial pulses two out of   four bilateral, capillary refill normal.   3. Musculoskeletal: All muscle groups are graded 5 out of 5 in the foot and ankle. POP to HPK tissue to valerie foot   4. Neurological: Normal sharp dull sensation; reflexes normal.             ASSESSMENT AND PLAN:   Diagnoses and all orders for this visit:    Diabetic polyneuropathy associated with type 2 diabetes mellitus (HCC)    Metatarsalgia of both feet    Callus of foot          Plan:       -Patient examined, chart history reviewed.  -Discussed importance of proper pedal hygiene, regular foot checks, and tight glucose control.  -Sharply pared HPKs to valerie feet using a #15 blade to healthy skin   -apply betadine and bandaid to right first metatarsal HPK site.    -Ambulate with supportive shoes and inserts and avoid walking barefoot.  -Educated patient on acute signs of infection advised patient to seek immediate medical attention if symptoms arise.    RTC in 2 months      The patient indicates understanding of these issues and agrees to the plan.        Suha Valencia DPM

## 2024-03-08 ENCOUNTER — OFFICE VISIT (OUTPATIENT)
Dept: ENDOCRINOLOGY CLINIC | Facility: CLINIC | Age: 74
End: 2024-03-08

## 2024-03-08 VITALS
HEIGHT: 68 IN | DIASTOLIC BLOOD PRESSURE: 72 MMHG | WEIGHT: 132 LBS | SYSTOLIC BLOOD PRESSURE: 118 MMHG | HEART RATE: 62 BPM | BODY MASS INDEX: 20 KG/M2

## 2024-03-08 DIAGNOSIS — E11.65 TYPE 2 DIABETES MELLITUS WITH HYPERGLYCEMIA, WITH LONG-TERM CURRENT USE OF INSULIN (HCC): ICD-10-CM

## 2024-03-08 DIAGNOSIS — E11.65 UNCONTROLLED TYPE 2 DIABETES MELLITUS WITH HYPERGLYCEMIA (HCC): Primary | ICD-10-CM

## 2024-03-08 DIAGNOSIS — E78.5 DYSLIPIDEMIA: ICD-10-CM

## 2024-03-08 DIAGNOSIS — E78.00 HYPERCHOLESTEREMIA: ICD-10-CM

## 2024-03-08 DIAGNOSIS — Z79.4 TYPE 2 DIABETES MELLITUS WITH HYPERGLYCEMIA, WITH LONG-TERM CURRENT USE OF INSULIN (HCC): ICD-10-CM

## 2024-03-08 LAB
CARTRIDGE LOT#: ABNORMAL NUMERIC
GLUCOSE BLOOD: 159
HEMOGLOBIN A1C: 7.9 % (ref 4.3–5.6)
TEST STRIP LOT #: NORMAL NUMERIC

## 2024-03-08 PROCEDURE — 99213 OFFICE O/P EST LOW 20 MIN: CPT | Performed by: INTERNAL MEDICINE

## 2024-03-08 PROCEDURE — 82947 ASSAY GLUCOSE BLOOD QUANT: CPT | Performed by: INTERNAL MEDICINE

## 2024-03-08 PROCEDURE — 83036 HEMOGLOBIN GLYCOSYLATED A1C: CPT | Performed by: INTERNAL MEDICINE

## 2024-03-08 RX ORDER — PEN NEEDLE, DIABETIC 29 G X1/2"
NEEDLE, DISPOSABLE MISCELLANEOUS
Qty: 100 EACH | Refills: 0 | Status: SHIPPED | OUTPATIENT
Start: 2024-03-08

## 2024-03-08 RX ORDER — ATORVASTATIN CALCIUM 10 MG/1
10 TABLET, FILM COATED ORAL NIGHTLY
Qty: 90 TABLET | Refills: 1 | Status: SHIPPED | OUTPATIENT
Start: 2024-03-08

## 2024-03-08 RX ORDER — INSULIN GLARGINE 100 [IU]/ML
25 INJECTION, SOLUTION SUBCUTANEOUS NIGHTLY
Qty: 22.5 ML | Refills: 0 | Status: SHIPPED | OUTPATIENT
Start: 2024-03-08 | End: 2024-06-06

## 2024-03-08 RX ORDER — INSULIN DETEMIR 100 [IU]/ML
25 INJECTION, SOLUTION SUBCUTANEOUS DAILY
COMMUNITY
Start: 2024-01-18 | End: 2024-03-08

## 2024-03-08 RX ORDER — LISINOPRIL 10 MG/1
10 TABLET ORAL DAILY
Qty: 90 TABLET | Refills: 1 | Status: SHIPPED | OUTPATIENT
Start: 2024-03-08

## 2024-03-08 NOTE — PROGRESS NOTES
FU VISIT  DM    CHIEF COMPLAINT:    Chief Complaint   Patient presents with    Diabetes        HISTORY OF PRESENT ILLNESS:   Luzmaria Phelan is a 73 year old male who is here to FU  for DM.     DM HISTORY  Diagnosed: 2005      HISTORY OF DIABETES COMPLICATIONS: :  History of Retinopathy: denies- last eye exam : is due for eye exam, will schedule soon  History of Neuropathy: denies  History of Nephropathy: denies    ASSOCIATED COMPLICATIONS:   HTN: yes  Hyperlipidemia: yes  Coronary Artery Disease:  denies  Cerebrovascular Disease: denies      HOME GLUCOSE READINGS:   Fasting : 110-115      No low BG under 70    CURRENT DIABETIC MEDICATIONS INCLUDE:  Levemir 25 daily  MTF 1000 mg BID with food      MEALS:  Poor compliance: eating out a lot, eating a lot of desserts    EXERCISE:  No       CURRENT MEDICATIONS:    Current Outpatient Medications   Medication Sig Dispense Refill    insulin glargine 100 UNIT/ML Subcutaneous Solution Inject 25 Units into the skin nightly. 22.5 mL 0    Insulin Syringe-Needle U-100 (BD INSULIN SYRINGE U/F) 30G X 1/2\" 0.5 ML Does not apply Misc Use once daily 100 each 0    lisinopril 10 MG Oral Tab Take 1 tablet (10 mg total) by mouth daily. 90 tablet 1    atorvastatin 10 MG Oral Tab Take 1 tablet (10 mg total) by mouth nightly. 90 tablet 1    metFORMIN HCl 1000 MG Oral Tab Take 1 tablet (1,000 mg total) by mouth 2 (two) times daily with meals. 180 tablet 0    omeprazole 20 MG Oral Capsule Delayed Release Take 1 capsule (20 mg total) by mouth every morning before breakfast. 30 capsule 0    aspirin 81 MG Oral Tab Take 1 tablet (81 mg total) by mouth daily.      Insulin Glargine, 2 Unit Dial, (TOUJEO MAX SOLOSTAR) 300 UNIT/ML Subcutaneous Solution Pen-injector Inject 24 Units into the skin daily. 21 mL 0       PAST MEDICAL HISTORY:   Past Medical History:   Diagnosis Date    Diabetes (HCC)        PAST SURGICAL HISTORY:   Past Surgical History:   Procedure Laterality Date    OTHER SURGICAL HISTORY       LEFT FOOT CRUSH INJURY AFTER TRAIN DERAILED       ALLERGIES:  No Known Allergies    SOCIAL HISTORY:    Social History     Socioeconomic History    Marital status:    Tobacco Use    Smoking status: Never    Smokeless tobacco: Never   Substance and Sexual Activity    Alcohol use: No    Drug use: No       FAMILY HISTORY:   Family History   Problem Relation Age of Onset    Diabetes Father     Lipids Brother     Heart Disorder Neg     Hypertension Neg     Retinal detachment Neg     Strabismus Neg     Macular degeneration Neg     Glaucoma Neg     Amblyopia Neg        ASSESSMENTS:        REVIEW OF SYSTEMS:  Constitutional: Negative for: weight change, fever, fatigue, cold/heat intolerance  Eyes: Negative for:  Visual changes, proptosis, blurring, floaters, poor night vision, impaired color vision  ENT: Negative for:  dysphagia, neck swelling, dysphonia  Respiratory: Negative for:  dyspnea, cough  Cardiovascular: Negative for:  chest pain, palpitations, orthopnea  GI: Negative for:  abdominal pain, nausea, vomiting, diarrhea, constipation, bleeding  Neurology: Negative for: headache, numbness, weakness,   Genito-Urinary: Negative for: dysuria, frequency  Psychiatric: Negative for:  depression, anxiety  Hematology/Lymphatics: Negative for: bruising, lower extremity edema  Endocrine: Negative for: polyuria, polydypsia  Skin: Negative for: rash, blister,      PHYSICAL EXAM:   Vitals:    03/08/24 1008   BP: 118/72   Pulse: 62   Weight: 132 lb (59.9 kg)   Height: 5' 8\" (1.727 m)     BMI: Body mass index is 20.07 kg/m².       General Appearance:  alert, well developed, in no acute distress  Head: Atraumatic  Eyes:  normal conjunctivae, sclera., normal sclera and normal pupils  Throat/Neck: normal sound to voice. Normal hearing, normal speech  Respiratory:  Speaking in full sentences, non-labored. no increased work of breathing, no audible wheezing    Neuro: motor grossly intact, moving all extremities without  difficulty  Psychiatric:  oriented to time, self, and place  Extremities: July 2024  Bilateral barefoot skin diabetic exam is normal, visualized feet and the appearance is normal except calluses--> b/l soles  Bilateral monofilament/sensation of both feet is normal.  Pulsation pedal pulse exam of both lower legs/feet is normal as well.        DATA:     Pertinent data reviewed  a1c is 7.9 % ( 3/2024)    ASSESSMENT AND PLAN:    1. DM: a1c is high this time     Plan:  Discussed the pathogenesis, natural course of diabetes. Patient understands the importance of glycemic control and the implications of uncontrolled diabetes including Diabetic ketoacidosis and various micro vascular and macrovascular complications.        a). Medications:    A1c is elevated. He has not been compliant with diet   I discussed adding medication   Patient states that he will like to work on lifestyle changes  He will also start checking sugars before dinner  CPM and work on lifestyle changes as discussed    Metformin 1000 mg BID take with food  GI SE reveiwed    Levemir 25 daily  Reviewed risk of hypoglycemia with insulin      b). No h/o Nephropathy  c). Discussed importance of annual eye exams  d). Foot exam: Daily feet exam explained  e). BG log maintainence explained in great detail, to get log and glucometer on next visit.  f). Life style changes reviewed  g). Hypoglycemia recognition and management discussed    2. Patient’s BP is normal today  3. Dyslipidemia  A) Discussed lifestyle modifications including reductions in dietary total and saturated fat, weight loss, aerobic exercise, and eating a diet rich in fruits and vegetables.  B) LDL is okay  C/w statin: tolerating without SE        RTC in 4 months  Check and call with sugars as discussed  Patient verbalized a complete  understanding of all of the above and did not have any further questions.       Orders Placed This Encounter   Procedures    POC HemoCue Glucose 201 (Finger stick  glucose)    POC Glycohemoglobin [83099]         Opal Bailey MD

## 2024-04-17 DIAGNOSIS — Z79.4 TYPE 2 DIABETES MELLITUS WITH OTHER SPECIFIED COMPLICATION, WITH LONG-TERM CURRENT USE OF INSULIN (HCC): ICD-10-CM

## 2024-04-17 DIAGNOSIS — E11.69 TYPE 2 DIABETES MELLITUS WITH OTHER SPECIFIED COMPLICATION, WITH LONG-TERM CURRENT USE OF INSULIN (HCC): ICD-10-CM

## 2024-04-18 NOTE — TELEPHONE ENCOUNTER
Endocrine Refill protocol for oral and injectable diabetic medications    Protocol Criteria:    -Appointment with Endocrinology completed in the last 6 months or scheduled in the next 3 months    -A1c result <8.5% in the past 6 months      Verify the above has been completed or scheduled in the appropriate timeline. If so can send a 90 day supply with 1 refill.     Last completed office visit:03/08/24  Next scheduled Follow up: no f/u  Last A1c result: 7.9%

## 2024-05-02 ENCOUNTER — TELEPHONE (OUTPATIENT)
Dept: FAMILY MEDICINE CLINIC | Facility: CLINIC | Age: 74
End: 2024-05-02

## 2024-05-02 NOTE — TELEPHONE ENCOUNTER
Called pt to inform him he's due for his Diabetic eye exam pt stated he will call back to schedule.

## 2024-05-17 RX ORDER — INSULIN GLARGINE 100 [IU]/ML
25 INJECTION, SOLUTION SUBCUTANEOUS NIGHTLY
Qty: 20 ML | Refills: 0 | Status: SHIPPED | OUTPATIENT
Start: 2024-05-17

## 2024-05-20 RX ORDER — LANCETS
EACH MISCELLANEOUS
Qty: 200 EACH | Refills: 3 | Status: SHIPPED | OUTPATIENT
Start: 2024-05-20

## 2024-05-20 NOTE — TELEPHONE ENCOUNTER
REFILL PASSED PER [facility] PROTOCOLS    Requested Prescriptions   Pending Prescriptions Disp Refills    CONTOUR NEXT TEST In Vitro Strip [Pharmacy Med Name: Contour Next Blood Glucose Test Charito Asce] 200 strip 0     Sig: CHECK BLOOD SUGAR TWICE DAILY       Diabetic Supplies Protocol Passed - 5/16/2024  8:54 PM        Passed - In person appointment or virtual visit in the past 12 mos or appointment in next 3 mos     Recent Outpatient Visits              2 months ago Uncontrolled type 2 diabetes mellitus with hyperglycemia ([facility])    [facility] [place] Opal Bailey MD    Office Visit    2 months ago Diabetic polyneuropathy associated with type 2 diabetes mellitus [facility]    Office Visit    5 months ago Uncontrolled type 2 diabetes mellitus with hyperglycemia ([facility])    [facility] Elidia Valdez MD    Office Visit    8 months ago Callus of foot    [facility] Suha Valencia DPM    Office Visit    9 months ago Diabetic polyneuropathy associated with type 2 diabetes mellitus ([facility]    [facility] Visit          Future Appointments         Provider Department Appt Notes    In 2 weeks Elidia Valdez MD [facility] shoulder pain                      MICROLET LANCETS Does not apply Misc [Pharmacy Med Name: Microlet Lancets Mis Asce]  0     Sig: CHECK BLOOD SUGAR TWICE DAILY       Diabetic Supplies Protocol Passed - 5/16/2024  8:54 PM        Passed - In person appointment or virtual visit in the past 12 mos or appointment in next 3 mos     Recent Outpatient Visits              2 months ago Uncontrolled type 2 diabetes mellitus with hyperglycemia ([facility])    [facility],  [facility], Opal Lane MD    Office Visit    2 months ago Diabetic polyneuropathy associated with type 2 diabetes mellitus ([facility])    [facility] [place] [facility]    Office Visit    5 months ago Uncontrolled type 2 diabetes mellitus with hyperglycemia (HCC)    [facility], [place] Elidia Valdez MD    Office Visit    8 months ago Callus of foot    [facility], Alden Delunalian, DPM    Office Visit    9 months ago Diabetic polyneuropathy associated with type 2 diabetes mellitus ([facility])    [facility], [place] D[facility]    Office Visit          Future Appointments         Provider Department Appt Notes    In 2 weeks Elidia Valdez MD [facility], [place] shoulder pain                         Future Appointments         Provider Department Appt Notes    In 2 weeks Elidia Valdez MD [facility], [place] shoulder pain          Recent Outpatient Visits              2 months ago Uncontrolled type 2 diabetes mellitus with hyperglycemia (HCC)    [facility], Opal Lane MD    Office Visit    2 months ago Diabetic polyneuropathy associated with type 2 diabetes mellitus ([facility])    [facility], Suha Linder, DPM    Office Visit    5 months ago Uncontrolled type 2 diabetes mellitus with hyperglycemia ([facility])    [facility], [place] Elidia Valdez MD    Office Visit    8 months ago Callus of foot    [facility], Suha Deluna, DPM    Office Visit    9 months ago Diabetic polyneuropathy associated with type 2 diabetes mellitus ([facility])    [facility],  Suha Linder, JUAN    Office Visit

## 2024-05-22 ENCOUNTER — TELEPHONE (OUTPATIENT)
Dept: FAMILY MEDICINE CLINIC | Facility: CLINIC | Age: 74
End: 2024-05-22

## 2024-05-22 NOTE — TELEPHONE ENCOUNTER
Pharmacy called in for refill on Rx contour strips and lantus, in order to bill insurance they need to know if patient is insulin dependent or non insulin dependent. Please advise           Current Outpatient Medications   Medication Sig Dispense Refill    Glucose Blood (CONTOUR NEXT TEST) In Vitro Strip CHECK BLOOD SUGAR TWICE DAILY 200 strip 3    Microlet Lancets Does not apply Misc CHECK BLOOD SUGAR TWICE DAILY 200 each 3    LANTUS 100 UNIT/ML Subcutaneous Solution Inject 25 Units into the skin nightly 20 mL 0

## 2024-05-24 NOTE — TELEPHONE ENCOUNTER
Spoke to Soo, pharmacy technician - [place], verified Name and . States prescription should indicate if patient is insulin dependent or non-insulin dependent on the diagnosis code.    Medication Quantity Refills Start End   Microlet Lancets Does not apply Misc 200 each 3 2024 --   Sig:   CHECK BLOOD SUGAR TWICE DAILY     Route:   (none)     Note to Pharmacy:   DX code E11.42.     Earliest Fill Date:   2024     Order #:   [id number]       Medication Quantity Refills Start End   Glucose Blood (CONTOUR NEXT TEST) In Vitro Strip 200 strip 3 2024 --   Sig:   CHECK BLOOD SUGAR TWICE DAILY     Route:   (none)     Note to Pharmacy:   DX code E11.42     Earliest Fill Date:   2024     Order #:   [id number]       Dr. Valdez please advise.

## 2024-05-28 RX ORDER — LANCETS
EACH MISCELLANEOUS
Qty: 200 EACH | Refills: 1 | Status: SHIPPED | OUTPATIENT
Start: 2024-05-28

## 2024-05-28 NOTE — TELEPHONE ENCOUNTER
Endocrine Refill protocol for Glucose testing supplies       Protocol Criteria:  Appointment with Endocrinology completed in the last 12 months or scheduled in the next 6 months     Verify appointment has been completed or scheduled in the appropriate timeline. If so can send a 90 day supply with 1 refill.        Last completed office visit: 3/8/24  Next scheduled Follow up:  RTC 4 months, none scheduled.   Future Appointments   Date Time Provider Department Center   6/4/2024 11:15 AM Elidia Valdez MD [facility]  [place]

## 2024-06-04 ENCOUNTER — OFFICE VISIT (OUTPATIENT)
Dept: FAMILY MEDICINE CLINIC | Facility: CLINIC | Age: 74
End: 2024-06-04
Payer: MEDICARE

## 2024-06-04 ENCOUNTER — LAB ENCOUNTER (OUTPATIENT)
Dept: LAB | Age: 74
End: 2024-06-04
Attending: FAMILY MEDICINE
Payer: MEDICARE

## 2024-06-04 VITALS
WEIGHT: 129 LBS | HEIGHT: 68 IN | DIASTOLIC BLOOD PRESSURE: 70 MMHG | SYSTOLIC BLOOD PRESSURE: 120 MMHG | BODY MASS INDEX: 19.55 KG/M2 | OXYGEN SATURATION: 97 % | HEART RATE: 64 BPM | TEMPERATURE: 97 F

## 2024-06-04 DIAGNOSIS — E11.65 UNCONTROLLED TYPE 2 DIABETES MELLITUS WITH HYPERGLYCEMIA (HCC): ICD-10-CM

## 2024-06-04 DIAGNOSIS — G89.29 CHRONIC RIGHT SHOULDER PAIN: Primary | ICD-10-CM

## 2024-06-04 DIAGNOSIS — M25.511 CHRONIC RIGHT SHOULDER PAIN: Primary | ICD-10-CM

## 2024-06-04 DIAGNOSIS — R12 HEARTBURN: ICD-10-CM

## 2024-06-04 DIAGNOSIS — Z12.11 COLON CANCER SCREENING: ICD-10-CM

## 2024-06-04 LAB
CREAT UR-SCNC: 67.2 MG/DL
MICROALBUMIN UR-MCNC: <0.3 MG/DL

## 2024-06-04 PROCEDURE — 82570 ASSAY OF URINE CREATININE: CPT

## 2024-06-04 PROCEDURE — 99213 OFFICE O/P EST LOW 20 MIN: CPT | Performed by: FAMILY MEDICINE

## 2024-06-04 PROCEDURE — 82043 UR ALBUMIN QUANTITATIVE: CPT

## 2024-06-04 RX ORDER — OMEPRAZOLE 20 MG/1
20 CAPSULE, DELAYED RELEASE ORAL
Qty: 30 CAPSULE | Refills: 0 | Status: SHIPPED | OUTPATIENT
Start: 2024-06-04

## 2024-06-04 NOTE — PROGRESS NOTES
HPI:    Patient ID: Luzmaria Phelan is a 73 year old male.      Shoulder Pain   Pertinent negatives include no fever or numbness.       Chief Complaint   Patient presents with    Shoulder Pain     Right shoulder on and off had a fall two years ago        Wt Readings from Last 6 Encounters:   06/04/24 129 lb (58.5 kg)   03/08/24 132 lb (59.9 kg)   12/14/23 129 lb (58.5 kg)   07/07/23 130 lb 2 oz (59 kg)   05/09/23 128 lb (58.1 kg)   11/02/22 129 lb (58.5 kg)     BP Readings from Last 3 Encounters:   06/04/24 120/70   03/08/24 118/72   12/14/23 113/66     Patient here with c/o  right shoulder pain  Had pain after falling, slipping on floor and  hitting on shoulder    Xray shoulder 5/2020 was normal    Range of motion is good.  Pain worse when he sleeps with his arm up  No weakness.  Pain radiates into right side neck.      Patient is right handed     Review of Systems   Constitutional:  Negative for chills and fever.   Musculoskeletal:  Positive for arthralgias and neck pain. Negative for back pain, gait problem, joint swelling, myalgias and neck stiffness.   Neurological:  Negative for weakness and numbness.       /70   Pulse 64   Temp 97.4 °F (36.3 °C) (Temporal)   Ht 5' 8\" (1.727 m)   Wt 129 lb (58.5 kg)   SpO2 97%   BMI 19.61 kg/m²          Current Outpatient Medications   Medication Sig Dispense Refill    omeprazole 20 MG Oral Capsule Delayed Release Take 1 capsule (20 mg total) by mouth every morning before breakfast. 30 capsule 0    Glucose Blood (CONTOUR NEXT TEST) In Vitro Strip CHECK BLOOD SUGAR TWICE DAILY 200 strip 1    Microlet Lancets Does not apply Misc CHECK BLOOD SUGAR TWICE DAILY 200 each 1    LANTUS 100 UNIT/ML Subcutaneous Solution Inject 25 Units into the skin nightly 20 mL 0    metFORMIN HCl 1000 MG Oral Tab Take 1 tablet (1,000 mg total) by mouth 2 (two) times daily with meals. 180 tablet 1    Insulin Syringe-Needle U-100 (BD INSULIN SYRINGE U/F) 30G X 1/2\" 0.5 ML Does not apply  Misc Use once daily 100 each 0    lisinopril 10 MG Oral Tab Take 1 tablet (10 mg total) by mouth daily. 90 tablet 1    atorvastatin 10 MG Oral Tab Take 1 tablet (10 mg total) by mouth nightly. 90 tablet 1    aspirin 81 MG Oral Tab Take 1 tablet (81 mg total) by mouth daily.       Allergies:No Known Allergies   PHYSICAL EXAM:     Chief Complaint   Patient presents with    Shoulder Pain     Right shoulder on and off had a fall two years ago       Physical Exam  Vitals reviewed.   Neck:      Vascular: No carotid bruit.      Comments: Spasm and tightness right side neck muscles   Musculoskeletal:      Cervical back: Tenderness present. No rigidity. Pain with movement and muscular tenderness present. No spinous process tenderness. Normal range of motion.   Lymphadenopathy:      Cervical: No cervical adenopathy.   Neurological:      Mental Status: He is alert.                ASSESSMENT/PLAN:     Encounter Diagnoses   Name Primary?    Chronic right shoulder pain Yes    Colon cancer screening     Uncontrolled type 2 diabetes mellitus with hyperglycemia (HCC)     Heartburn        1. Chronic right shoulder pain  Suspect rotator cuff strain  Follow up if worse, consider orthopedics   - Physical Therapy Referral - [place] Locations    2. Colon cancer screening  FIT test     3. Uncontrolled type 2 diabetes mellitus with hyperglycemia (HCC)  Seeing endo    - Microalb/Creat Ratio, Random Urine; Future    4. Heartburn  Requesting refill   - omeprazole 20 MG Oral Capsule Delayed Release; Take 1 capsule (20 mg total) by mouth every morning before breakfast.  Dispense: 30 capsule; Refill: 0      Orders Placed This Encounter   Procedures    Microalb/Creat Ratio, Random Urine         The above note was creating using Dragon speech recognition technology. Please excuse any typos    Meds This Visit:  Requested Prescriptions     Signed Prescriptions Disp Refills    omeprazole 20 MG Oral Capsule Delayed Release 30 capsule 0     Sig: Take 1  capsule (20 mg total) by mouth every morning before breakfast.       Imaging & Referrals:  PHYSICAL THERAPY - INTERNAL       ID#[id number]

## 2024-06-20 DIAGNOSIS — E11.65 UNCONTROLLED TYPE 2 DIABETES MELLITUS WITH HYPERGLYCEMIA (HCC): ICD-10-CM

## 2024-06-21 RX ORDER — PEN NEEDLE, DIABETIC 29 G X1/2"
NEEDLE, DISPOSABLE MISCELLANEOUS
Qty: 100 EACH | Refills: 0 | Status: SHIPPED | OUTPATIENT
Start: 2024-06-21

## 2024-08-13 RX ORDER — INSULIN GLARGINE 100 [IU]/ML
25 INJECTION, SOLUTION SUBCUTANEOUS NIGHTLY
Qty: 20 ML | Refills: 1 | Status: SHIPPED | OUTPATIENT
Start: 2024-08-13

## 2024-08-13 NOTE — TELEPHONE ENCOUNTER
Endocrine refill protocol for basal insulins     Protocol Criteria: PASSED  - Appointment with Endocrinology completed in the last 6 months or scheduled in the next 3 months    - A1c result completed in the last 6 months and is below 8.5%     Verify appointment has been completed or scheduled in the appropriate timeline. If so can send a 90 day supply with 1 refill per provider protocol.    Verify A1c has been completed within the last 6 months and is below 8.5%     Last completed office visit:3/8/2024 Opal Bailey MD   Next scheduled Follow up: 9/3/24     Last A1c result: Last A1c value was 7.9% done 3/8/2024.

## 2024-08-30 DIAGNOSIS — E11.65 UNCONTROLLED TYPE 2 DIABETES MELLITUS WITH HYPERGLYCEMIA (HCC): ICD-10-CM

## 2024-08-30 RX ORDER — LISINOPRIL 10 MG/1
10 TABLET ORAL DAILY
Qty: 90 TABLET | Refills: 1 | Status: SHIPPED | OUTPATIENT
Start: 2024-08-30

## 2024-08-30 NOTE — TELEPHONE ENCOUNTER
Endocrine Refill protocol for oral antihypertensive medications    Protocol Criteria:  PASSED  Reason: N/A    -Appointment with Endocrinology completed in the last 6 months or scheduled in the next 3 months    -BMP or CMP has been completed in the last 12 months     -GFR is greater than or equal to 50    Verify the above has been completed or scheduled in the appropriate timeline. If so can send a 90 day supply with 1 refill.   Verify BMP or CMP has been completed in last year  Verify last GFR result     Last completed office visit:3/8/2024 Opal Bailey MD   Next scheduled Follow up: 9/3/24    Last BMP or CMP completion date:  Lab Results   Component Value Date    GFRAA 79 04/04/2022    GFRNAA 69 04/04/2022    EGFRCR 75 12/14/2023

## 2024-09-03 ENCOUNTER — OFFICE VISIT (OUTPATIENT)
Dept: ENDOCRINOLOGY CLINIC | Facility: CLINIC | Age: 74
End: 2024-09-03
Payer: MEDICARE

## 2024-09-03 VITALS
HEART RATE: 48 BPM | DIASTOLIC BLOOD PRESSURE: 73 MMHG | BODY MASS INDEX: 19.55 KG/M2 | WEIGHT: 129 LBS | SYSTOLIC BLOOD PRESSURE: 152 MMHG | HEIGHT: 68 IN

## 2024-09-03 DIAGNOSIS — Z79.4 TYPE 2 DIABETES MELLITUS WITH OTHER SPECIFIED COMPLICATION, WITH LONG-TERM CURRENT USE OF INSULIN (HCC): ICD-10-CM

## 2024-09-03 DIAGNOSIS — E11.69 TYPE 2 DIABETES MELLITUS WITH OTHER SPECIFIED COMPLICATION, WITH LONG-TERM CURRENT USE OF INSULIN (HCC): ICD-10-CM

## 2024-09-03 DIAGNOSIS — E78.00 HYPERCHOLESTEREMIA: ICD-10-CM

## 2024-09-03 DIAGNOSIS — E78.5 DYSLIPIDEMIA: Primary | ICD-10-CM

## 2024-09-03 LAB
GLUCOSE BLOOD: 141
HEMOGLOBIN A1C: 6.8 % (ref 4.3–5.6)
TEST STRIP LOT #: NORMAL NUMERIC

## 2024-09-03 PROCEDURE — 82947 ASSAY GLUCOSE BLOOD QUANT: CPT | Performed by: INTERNAL MEDICINE

## 2024-09-03 PROCEDURE — 99213 OFFICE O/P EST LOW 20 MIN: CPT | Performed by: INTERNAL MEDICINE

## 2024-09-03 PROCEDURE — 83036 HEMOGLOBIN GLYCOSYLATED A1C: CPT | Performed by: INTERNAL MEDICINE

## 2024-09-03 RX ORDER — INSULIN GLARGINE 100 [IU]/ML
25 INJECTION, SOLUTION SUBCUTANEOUS DAILY
Qty: 30 ML | Refills: 1 | Status: SHIPPED | OUTPATIENT
Start: 2024-09-03

## 2024-09-03 RX ORDER — ATORVASTATIN CALCIUM 10 MG/1
10 TABLET, FILM COATED ORAL NIGHTLY
Qty: 90 TABLET | Refills: 1 | Status: SHIPPED | OUTPATIENT
Start: 2024-09-03

## 2024-09-03 RX ORDER — BLOOD-GLUCOSE METER
1 EACH MISCELLANEOUS
Qty: 1 KIT | Refills: 0 | Status: SHIPPED | OUTPATIENT
Start: 2024-09-03

## 2024-09-03 NOTE — PROGRESS NOTES
SHOULDER EVALUATION:     Diagnosis:    Chronic right shoulder pain (M25.511,G89.29)         Referring Provider: Elidia Valdez  Date of Evaluation:    9/3/2024    Precautions:  None Next MD visit:   none scheduled  Date of Surgery: n/a     PATIENT SUMMARY   Luzmaria Phelan is a 73 year old male who presents to therapy today with complaints of R shoulder pain and neck tightness     History of Current Condition:   1.5 years ago, slipped on floor and hit R shoulder on wall. Had XR after this and went to PCP and was told it was a sprain. Felt better but after some time the pain came back. Notes clicking in R shoulder with movement. Also has neck tightness now too.   Patient is R handed  Patient is retired.     Aggravating factors: sleeping and reaching for seatbelt   Relieving: change position, hot shower     Pt describes pain level current 0/10, at best 0/10, at worst 5/10. Dull/ache   Current functional limitations include reaching for seatbelt, sleeping.     Viqar describes prior level of function no hx shoulder pain prior to 1.5 years ago. Pt goals include get rid of the pain.  Past medical history was reviewed with Luzmaria. Significant findings include  has a past medical history of Diabetes (HCC).      ASSESSMENT  Luzmaria presents to physical therapy evaluation with primary c/o R shoulder pain that began 1.5 years ago after a fall and worsening recently as well as R sided neck tightness/stiffness. The results of the objective tests and measures show GH posterior glide hypomobility into posterior glide; pain to resisted testing ER, IR, and abductors of the shoulder; scapular dyskinesia; upper trap tightness.  Functional deficits include but are not limited to sleeping, reaching for seatbelt.  Signs and symptoms are consistent with diagnosis of rotator cuff strain. Further assessment of c-spine to be completed next visit. Pt and PT discussed evaluation findings, pathology, POC and HEP.  Pt voiced understanding and  performs HEP correctly without reported pain. Skilled Physical Therapy is medically necessary to address the above impairments and reach functional goals.     OBJECTIVE:   Observation/Posture: mild thoracic kyphosis with limited ability to correct  Palpation: pain to palpation over anterior cuff insertion    Cervical ROM:(*denotes pain)   Flexion(0-45): WNL   Extension(0-45/60): mildly reduced but non-painful  Side flexion (0-45): R 25; L 12  Rotation(0-60/80): R 50; L 60       AROM: (* denotes performed with pain)  Shoulder    Flexion (0-165/180): R 165; L 160  Abduction(0-165/180): R 170; L 170  ER (0-80/100):   *(arm at side) R 50; L 54 (arm at side)   *arm at 90: R 90; L 80*  IR(0-80/100): R T10; L T5  *arm at 90: R 80; 65*     Accessory motion: posterior glide max hypomobility R; moderate inferior glide hypomobility but symmetrical R and L      Strength/MMT: (* denotes performed with pain)  Shoulder Elbow Scapular   Flexion: R 5/5; L 5/5  Abduction: R 4*/5; L 5/5  ER: R 4-*/5; L 4/5  In 90 flexion: R pain and weak; L 4/5   IR: R 4*/5; L 5/5  In 90 flexion: R pain and weak; 5/5 Flexion: R 5/5; L 5/5  Extension: R 5/5; L 5/5   TBT     Special tests:     Subacromial Pain Syndrome:  Empty Can test: R -, L -  Painful Arc Sign: R -, L -  External Rotation Resistance: R +, L -  Levi-Narinder Impingement Sign: R -, L -  Palpable Tenderness Infraspinatus and Supraspinatus: R +, L -      Today’s Treatment and Response:   Pt education was provided on exam findings, treatment diagnosis, treatment plan, expectations, and prognosis. Pt was also provided recommendations for activity modifications, possible soreness after evaluation, and postural corrections.    Manual Therapy: posterior humeral glide R G3, then combined with IR stretching in varying range (with sustained glide, no longer pain with passive IR) (8')      Patient was instructed in and issued a HEP for:   Access Code: [email or web address]    Exercises  - Sidelying Shoulder  ER with Towel and Dumbbell  - 1 x daily - 7 x weekly - 2 sets - 12-15 reps, 2#  - Standing Row with Anchored Resistance  - 1 x daily - 7 x weekly - 2 sets - 12 reps, RTB  - Supine Scapular Protraction in Flexion with Dumbbells  - 1 x daily - 7 x weekly - 2 sets - 10 reps, up to 2#  - Standing Shoulder Scaption  - 1 x daily - 7 x weekly - 2 sets - 10 reps, no weight, focus on scap squeeze prior and 3s hold at 90 dg     *cues required to prevent compensatory motions with multiple exercises     Charges: PT Eval Low Complexity, TE x 1; Manual x 1      Total Timed Treatment: 23 min     Total Treatment Time: 50 min     PLAN OF CARE:    Goals: (to be met in 10 visits)   Pt will report improved ability to sleep without waking due to shoulder pain  Pt will improve shoulder strength throughout to 4+/5 to improve function with ADLs including reaching for the seatbelt    Pt will improve Quickdash score by 5% to demonstrate overall improvement in function.   Pt will be independent and compliant with comprehensive HEP to maintain progress achieved in PT      Frequency / Duration: Patient will be seen for 1-2 x/week or a total of 10 visits over a 90 day period. Treatment will include: Manual Therapy, Neuromuscular Re-education, Therapeutic Activities, Therapeutic Exercise, and Home Exercise Program instruction    Education or treatment limitation: None  Rehab Potential:excellent    QuickDASH Outcome Score  Score: 11.36 % (9/5/2024  2:46 PM)      Patient/Family/Caregiver was advised of these findings, precautions, and treatment options and has agreed to actively participate in planning and for this course of care.    Thank you for your referral. Please co-sign or sign and return this letter via fax as soon as possible to [phone]. If you have any questions, please contact me at Dept: [phone]    Sincerely,  Electronically signed by therapist: Supriya Shipley, PT  Physician's certification required: Yes  I certify the need for  these services furnished under this plan of treatment and while under my care.    X___________________________________________________ Date____________________    Certification From: 9/3/2024  To:12/2/2024

## 2024-09-03 NOTE — PROGRESS NOTES
FU VISIT  DM    CHIEF COMPLAINT:    Chief Complaint   Patient presents with    Diabetes     Pt is in to follow up on diabetes, A1c check up          HISTORY OF PRESENT ILLNESS:   Luzmaria Phelan is a 73 year old male who is here to FU  for DM.     DM HISTORY  Diagnosed: 2005      HISTORY OF DIABETES COMPLICATIONS: :  History of Retinopathy: denies- last eye exam : is due for eye exam, will schedule soon, Dr. Cagle's information provided  History of Neuropathy: denies  History of Nephropathy: denies    ASSOCIATED COMPLICATIONS:   HTN: yes  Hyperlipidemia: yes  Coronary Artery Disease:  denies  Cerebrovascular Disease: denies      HOME GLUCOSE READINGS:   Fasting : 110-120      No low BG under 70    CURRENT DIABETIC MEDICATIONS INCLUDE:  Lantus  25 daily  MTF 1000 mg BID with food      MEALS:  Compliance is good    EXERCISE:  Yes, walks daily       CURRENT MEDICATIONS:    Current Outpatient Medications   Medication Sig Dispense Refill    LISINOPRIL 10 MG Oral Tab Take 1 tablet (10 mg total) by mouth daily 90 tablet 1    LANTUS 100 UNIT/ML Subcutaneous Solution Inject 25 Units into the skin nightly 20 mL 1    Insulin Syringe-Needle U-100 (BD INSULIN SYRINGE U/F) 30G X 1/2\" 0.5 ML Does not apply Misc USE ONCE DAILY 100 each 0    omeprazole 20 MG Oral Capsule Delayed Release Take 1 capsule (20 mg total) by mouth every morning before breakfast. 30 capsule 0    Glucose Blood (CONTOUR NEXT TEST) In Vitro Strip CHECK BLOOD SUGAR TWICE DAILY 200 strip 1    Microlet Lancets Does not apply Misc CHECK BLOOD SUGAR TWICE DAILY 200 each 1    metFORMIN HCl 1000 MG Oral Tab Take 1 tablet (1,000 mg total) by mouth 2 (two) times daily with meals. 180 tablet 1    atorvastatin 10 MG Oral Tab Take 1 tablet (10 mg total) by mouth nightly. 90 tablet 1    aspirin 81 MG Oral Tab Take 1 tablet (81 mg total) by mouth daily.         PAST MEDICAL HISTORY:   Past Medical History:    Diabetes (HCC)       PAST SURGICAL HISTORY:   Past Surgical History:    Procedure Laterality Date    Other surgical history      LEFT FOOT CRUSH INJURY AFTER TRAIN DERAILED       ALLERGIES:  No Known Allergies    SOCIAL HISTORY:    Social History     Socioeconomic History    Marital status:    Tobacco Use    Smoking status: Never    Smokeless tobacco: Never   Vaping Use    Vaping status: Never Used   Substance and Sexual Activity    Alcohol use: No    Drug use: No       FAMILY HISTORY:   Family History   Problem Relation Age of Onset    Diabetes Father     Lipids Brother     Heart Disorder Neg     Hypertension Neg     Retinal detachment Neg     Strabismus Neg     Macular degeneration Neg     Glaucoma Neg     Amblyopia Neg        ASSESSMENTS:        REVIEW OF SYSTEMS:  Constitutional: Negative for: weight change, fever, fatigue, cold/heat intolerance  Eyes: Negative for:  Visual changes, proptosis, blurring, floaters, poor night vision, impaired color vision  ENT: Negative for:  dysphagia, neck swelling, dysphonia  Respiratory: Negative for:  dyspnea, cough  Cardiovascular: Negative for:  chest pain, palpitations, orthopnea  GI: Negative for:  abdominal pain, nausea, vomiting, diarrhea, constipation, bleeding  Neurology: Negative for: headache, numbness, weakness,   Genito-Urinary: Negative for: dysuria, frequency  Psychiatric: Negative for:  depression, anxiety  Hematology/Lymphatics: Negative for: bruising, lower extremity edema  Endocrine: Negative for: polyuria, polydypsia  Skin: Negative for: rash, blister,      PHYSICAL EXAM:   Vitals:    09/03/24 1450   BP: 155/72   Pulse: 52   Weight: 129 lb (58.5 kg)   Height: 5' 8\" (1.727 m)     BMI: Body mass index is 19.61 kg/m².       General Appearance:  alert, well developed, in no acute distress  Head: Atraumatic  Eyes:  normal conjunctivae, sclera., normal sclera and normal pupils  Throat/Neck: normal sound to voice. Normal hearing, normal speech  Respiratory:  Speaking in full sentences, non-labored. no increased work of  breathing, no audible wheezing    Neuro: motor grossly intact, moving all extremities without difficulty  Psychiatric:  oriented to time, self, and place  Extremities: September 2024  Bilateral barefoot skin diabetic exam is normal, visualized feet and the appearance is normal except calluses--> b/l soles--> follows with podiatry  Bilateral monofilament/sensation of both feet is normal.  Pulsation pedal pulse exam of both lower legs/feet is normal as well.        DATA:     Pertinent data reviewed  a1c is 6.8  % ( 9/2024)    ASSESSMENT AND PLAN:    1. DM:      Plan:  Discussed the pathogenesis, natural course of diabetes. Patient understands the importance of glycemic control and the implications of uncontrolled diabetes including Diabetic ketoacidosis and various micro vascular and macrovascular complications.        a). Medications:  Metformin 1000 mg BID take with food  GI SE reveiwed    Lantus 25 daily  Reviewed risk of hypoglycemia with insulin      b). No h/o Nephropathy  c). Discussed importance of annual eye exams  d). Foot exam: Daily feet exam explained  e). BG log maintainence explained in great detail, to get log and glucometer on next visit.  f). Life style changes reviewed  g). Hypoglycemia recognition and management discussed    2. Patient’s BP is okay   3. Dyslipidemia  A) Discussed lifestyle modifications including reductions in dietary total and saturated fat, weight loss, aerobic exercise, and eating a diet rich in fruits and vegetables.  B) LDL is okay  C/w statin: tolerating without SE        RTC in 4 months  Check and call with sugars as discussed  Patient verbalized a complete  understanding of all of the above and did not have any further questions.       Orders Placed This Encounter   Procedures    POC HemoCue Glucose 201 (Finger stick glucose)    POC Glycohemoglobin [11157]    Lipid Panel [E]    Comp Metabolic Panel [E]         Opal Bailey MD

## 2024-09-05 ENCOUNTER — OFFICE VISIT (OUTPATIENT)
Dept: PHYSICAL THERAPY | Facility: HOSPITAL | Age: 74
End: 2024-09-05
Attending: FAMILY MEDICINE
Payer: MEDICARE

## 2024-09-05 DIAGNOSIS — G89.29 CHRONIC RIGHT SHOULDER PAIN: Primary | ICD-10-CM

## 2024-09-05 DIAGNOSIS — M25.511 CHRONIC RIGHT SHOULDER PAIN: Primary | ICD-10-CM

## 2024-09-05 PROCEDURE — 97161 PT EVAL LOW COMPLEX 20 MIN: CPT

## 2024-09-05 PROCEDURE — 97110 THERAPEUTIC EXERCISES: CPT

## 2024-09-05 PROCEDURE — 97140 MANUAL THERAPY 1/> REGIONS: CPT

## 2024-09-09 NOTE — PROGRESS NOTES
Diagnosis:    Chronic right shoulder pain (M25.511,G89.29)         Referring Provider: Elidia Valdez  Date of Evaluation:    9/5/24    Precautions:  None Next MD visit:   none scheduled  Date of Surgery: n/a   Insurance Primary/Secondary: MEDICARE / MEDICAID     # Auth Visits: 10 per POC            Subjective: Reports on the first day had some pain after but otherwise okay, when goes into 90/90 still has pain.     Pain: 0/10 at rest       Objective:     Accessory motion: reduced into R rotation with PPIVM middle and lower cervical    Cervical ROM:(*denotes pain)  Eval   Flexion(0-45): WNL   Extension(0-45/60): mildly reduced but non-painful  Side flexion (0-45): R 25; L 12  Rotation(0-60/80): R 50; L 60       AROM: (* denotes performed with pain)  Shoulder    Flexion (0-165/180): R 165; L 160  Abduction(0-165/180): R 170; L 170  ER (0-80/100):   *(arm at side) R 50; L 54 (arm at side)   *arm at 90: R 90; L 80*  IR(0-80/100): R T10; L T5  *arm at 90: R 80; 65*      Strength/MMT: (* denotes performed with pain)  Shoulder Elbow Scapular   Flexion: R 5/5; L 5/5  Abduction: R 4*/5; L 5/5  ER: R 4-*/5; L 4/5  In 90 flexion: R pain and weak; L 4/5   IR: R 4*/5; L 5/5  In 90 flexion: R pain and weak; 5/5 Flexion: R 5/5; L 5/5  Extension: R 5/5; L 5/5    TBT         Assessment: Viqar tends to adopt scapular elevation with multiple activities unless cued. Scap setting with tactile cues throughout with rows. Held prone ex as unable to retract shoulder blade and then getting anterior shoulder pain. Added pec minor stretch as R shoulder sitting more protracted. Patient feels relief with upper trapezius stretch so added to home.       Patient was instructed in and issued a HEP for:   Access Code: [email or web address]  Exercises  - Sidelying Shoulder ER with Towel and Dumbbell  - 1 x daily - 7 x weekly - 2 sets - 12-15 reps, 2#  - Standing Row with Anchored Resistance  - 1 x daily - 7 x weekly - 2 sets - 12 reps, RTB  - Supine Scapular  Protraction in Flexion with Dumbbells  - 1 x daily - 7 x weekly - 2 sets - 10 reps, up to 2#  - Standing Shoulder Scaption  - 1 x daily - 7 x weekly - 2 sets - 10 reps, no weight, focus on scap squeeze prior and 3s hold at 90 dg       Access Code: [email or web address]  Exercises  - Seated Upper Trapezius Stretch  - 2 x daily - 7 x weekly - 3 sets - 20 hold    Goals:  (to be met in 10 visits)   Pt will report improved ability to sleep without waking due to shoulder pain  Pt will improve shoulder strength throughout to 4+/5 to improve function with ADLs including reaching for the seatbelt    Pt will improve Quickdash score by 5% to demonstrate overall improvement in function.   Pt will be independent and compliant with comprehensive HEP to maintain progress achieved in PT    Plan: progressive periscapular retraining; cuff strengthening; RS IR/ER in scaption  Date: 9/9/2024  TX#: 2/10 Date:                 TX#: 3/ Date:                 TX#: 4/ Date:                 TX#: 5/ Date:   Tx#: 6/   Manual Therapy: posterior humeral glide R G3, then combined with IR stretching in varying range (with sustained glide, no longer pain with passive IR) (8')  PVVIM cervical rotation into R G3   Downglides R C5/6 G3  UT stretch  3 x 20s by PT         Therapeutic Exercise:   RS 90 flexion 3 x 20s  Band IR 2 x 10   Band ER RTB 2 x 10  Prone horizontal abduction trial-held, could not scapular retract, pain with attempt horizontal abduction  Self lupper trap stretch 3 x 20s  Pec minor doorway stretch 20s x 3   Scaption with YTB to bias ER 2 x 10   Rows blue tubing 2 x 12                          Charges: Manual x 1 (17'); TE x 2 (23')       Total Timed Treatment: 40 min  Total Treatment Time: 40 min

## 2024-09-10 ENCOUNTER — OFFICE VISIT (OUTPATIENT)
Dept: PHYSICAL THERAPY | Facility: HOSPITAL | Age: 74
End: 2024-09-10
Attending: FAMILY MEDICINE
Payer: MEDICARE

## 2024-09-10 PROCEDURE — 97140 MANUAL THERAPY 1/> REGIONS: CPT

## 2024-09-10 PROCEDURE — 97110 THERAPEUTIC EXERCISES: CPT

## 2024-09-12 ENCOUNTER — OFFICE VISIT (OUTPATIENT)
Dept: PHYSICAL THERAPY | Facility: HOSPITAL | Age: 74
End: 2024-09-12
Attending: FAMILY MEDICINE
Payer: MEDICARE

## 2024-09-12 PROCEDURE — 97112 NEUROMUSCULAR REEDUCATION: CPT

## 2024-09-12 PROCEDURE — 97110 THERAPEUTIC EXERCISES: CPT

## 2024-09-12 NOTE — PROGRESS NOTES
Diagnosis:    Chronic right shoulder pain (M25.511,G89.29)         Referring Provider: Elidia Valdez  Date of Evaluation:    9/5/24    Precautions:  None Next MD visit:   none scheduled  Date of Surgery: n/a   Insurance Primary/Secondary: MEDICARE / MEDICAID     # Auth Visits: 10 per POC            Subjective: No significant soreness since the first day. Does all of his exercises.     Pain: 0/10 at rest       Objective:     Accessory motion: reduced into R rotation with PPIVM middle and lower cervical    Cervical ROM:(*denotes pain)  Eval   Flexion(0-45): WNL   Extension(0-45/60): mildly reduced but non-painful  Side flexion (0-45): R 25; L 12  Rotation(0-60/80): R 50; L 60       AROM: (* denotes performed with pain)  Shoulder    Flexion (0-165/180): R 165; L 160  Abduction(0-165/180): R 170; L 170  ER (0-80/100):   *(arm at side) R 50; L 54 (arm at side)   *arm at 90: R 90; L 80*  IR(0-80/100): R T10; L T5  *arm at 90: R 80; 65*      Strength/MMT: (* denotes performed with pain)  Shoulder Elbow Scapular   Flexion: R 5/5; L 5/5  Abduction: R 4*/5; L 5/5  ER: R 4-*/5; L 4/5  In 90 flexion: R pain and weak; L 4/5   IR: R 4*/5; L 5/5  In 90 flexion: R pain and weak; 5/5 Flexion: R 5/5; L 5/5  Extension: R 5/5; L 5/5    TBT         Assessment:  In supine, notable that R humeral head sits much more forward. Measuring from table to anterior shoulder 5\" L, 6\" R. Scapular dyskinesia present B. Work on RC and scapular ex this date in multiple positions. Mod to max tactile cues for scapular setting and with serratus press on wall, work on tapering end reps but then cues prn.     Patient was instructed in and issued a HEP for:   Access Code: [email or web address]  Exercises  - Sidelying Shoulder ER with Towel and Dumbbell  - 1 x daily - 7 x weekly - 2 sets - 12-15 reps, 2#  - Standing Row with Anchored Resistance  - 1 x daily - 7 x weekly - 2 sets - 12 reps, RTB  - Supine Scapular Protraction in Flexion with Dumbbells  - 1 x daily - 7 x  weekly - 2 sets - 10 reps, up to 2#  - Standing Shoulder Scaption  - 1 x daily - 7 x weekly - 2 sets - 10 reps, no weight, focus on scap squeeze prior and 3s hold at 90 dg     Access Code: [email or web address]  Exercises  - Seated Upper Trapezius Stretch  - 2 x daily - 7 x weekly - 3 sets - 20 hold    Goals:  (to be met in 10 visits)   Pt will report improved ability to sleep without waking due to shoulder pain  Pt will improve shoulder strength throughout to 4+/5 to improve function with ADLs including reaching for the seatbelt    Pt will improve Quickdash score by 5% to demonstrate overall improvement in function.   Pt will be independent and compliant with comprehensive HEP to maintain progress achieved in PT    Plan: progressive periscapular retraining; cuff strengthening; upgrade HEP   Date: 9/9/2024  TX#: 2/10 Date: 9/12/2024               TX#: 3/ Date:                 TX#: 4/ Date:                 TX#: 5/ Date:   Tx#: 6/   Manual Therapy: posterior humeral glide R G3, then combined with IR stretching in varying range (with sustained glide, no longer pain with passive IR) (8')  PVVIM cervical rotation into R G3   Downglides R C5/6 G3  UT stretch  3 x 20s by PT   Manual Therapy  posterior humeral glide R G3, then combined with IR stretching in varying range (with sustained glide, no longer pain with passive IR) (8')  Passive scapular stretching medial and inferior in SL on R        Therapeutic Exercise:   RS 90 flexion 3 x 20s  Band IR 2 x 10   Band ER RTB 2 x 10  Prone horizontal abduction trial-held, could not scapular retract, pain with attempt horizontal abduction  Self upper trap stretch 3 x 20s  Pec minor doorway stretch 20s x 3   Scaption with YTB to bias ER 2 x 10   Rows blue tubing 2 x 12  Therapeutic Exercise:   SL ER with scap setting 2# x 15, 3# x 12   SL horizontal abd 2# with scap setting by PT  SL scaption 2# 2 x 10   Supine horizontal abduction YTB 2 x 12   Wall serratus press with max tactile cues x  1.5'  Vertical wall walks YTB  5x                             Patent 15' late to session 9/12/2024    Charges: Manual x 1 (15'); TE x 1 (15') Total Timed Treatment: 30 min  Total Treatment Time: 30 min

## 2024-09-16 NOTE — TELEPHONE ENCOUNTER
[place] refills for Lantus.  Please call.  Thank you.    Current Outpatient Medications   Medication Sig Dispense Refill    insulin glargine (LANTUS) 100 UNIT/ML Subcutaneous Solution Inject 25 Units into the skin daily. 30 mL 1

## 2024-09-17 ENCOUNTER — OFFICE VISIT (OUTPATIENT)
Dept: PHYSICAL THERAPY | Facility: HOSPITAL | Age: 74
End: 2024-09-17
Attending: FAMILY MEDICINE
Payer: MEDICARE

## 2024-09-17 PROCEDURE — 97110 THERAPEUTIC EXERCISES: CPT

## 2024-09-17 PROCEDURE — 97140 MANUAL THERAPY 1/> REGIONS: CPT

## 2024-09-17 RX ORDER — INSULIN GLARGINE 100 [IU]/ML
25 INJECTION, SOLUTION SUBCUTANEOUS DAILY
Qty: 30 ML | Refills: 1 | Status: SHIPPED | OUTPATIENT
Start: 2024-09-17

## 2024-09-17 NOTE — PROGRESS NOTES
Diagnosis:    Chronic right shoulder pain (M25.511,G89.29)         Referring Provider: Elidia Valdez  Date of Evaluation:    9/5/24    Precautions:  None Next MD visit:   none scheduled  Date of Surgery: n/a   Insurance Primary/Secondary: MEDICARE / MEDICAID     # Auth Visits: 10 per POC            Subjective: No significant soreness since the first day. Does all of his exercises.     Pain: 0/10 at rest       Objective:     R arm at 90 abd, IR 85*; *    Accessory motion: reduced into R rotation with PPIVM middle and lower cervical    Cervical ROM:(*denotes pain)  Eval   Flexion(0-45): WNL   Extension(0-45/60): mildly reduced but non-painful  Side flexion (0-45): R 25; L 12  Rotation(0-60/80): R 50; L 60         Strength/MMT: (* denotes performed with pain)  Shoulder Elbow Scapular   Flexion: R 5/5; L 5/5  Abduction: R 4*/5; L 5/5  ER: R 4-*/5; L 4/5  In 90 flexion: R pain and weak; L 4/5   IR: R 4*/5; L 5/5  In 90 flexion: R pain and weak; 5/5 Flexion: R 5/5; L 5/5  Extension: R 5/5; L 5/5    TBT         Assessment:  R humeral head  sits much more anterior than L. Comparable sign of patient going into horizontal abduction, abduction and ER remains but noted how forward patient is at shoulder. With cues for scapular adduction for more neutral positioning, non-painful. Work on scapular control with closed chain serratus punches, less cuing required, only intermittently when goes into elevation. Worked on retraction without hiking, use of mirror. Instructed to do at home throughout the day with mirror feedback for a few reps with holds.   Improved tolerance for both IR and ER without pain since evaluation.     Patient was instructed in and issued a HEP for:   Access Code: [email or web address]  Exercises  - Seated Upper Trapezius Stretch  - 1-2 x daily - 7 x weekly - 3 sets - 20 hold  - Sidelying Shoulder ER with Towel and Dumbbell  - 1 x daily - 5 x weekly - 2 sets - 10-15 reps  - Horizontal Wall Walk with Resistance  - 1 x  daily - 5 x weekly - 2-3 sets - 8 reps  - Supine Shoulder Horizontal Abduction with Resistance  - 1 x daily - 5 x weekly - 2 sets - 12 reps    Goals:  (to be met in 10 visits)   Pt will report improved ability to sleep without waking due to shoulder pain  Pt will improve shoulder strength throughout to 4+/5 to improve function with ADLs including reaching for the seatbelt    Pt will improve Quickdash score by 5% to demonstrate overall improvement in function.   Pt will be independent and compliant with comprehensive HEP to maintain progress achieved in PT    Plan: add IR to HEP   Date: 9/9/2024  TX#: 2/10 Date: 9/12/2024               TX#: 3/ Date:   9/17/2024            TX#: 4/ Date:                 TX#: 5/ Date:   Tx#: 6/   Manual Therapy: posterior humeral glide R G3, then combined with IR stretching in varying range (with sustained glide, no longer pain with passive IR) (8')  PVVIM cervical rotation into R G3   Downglides R C5/6 G3  UT stretch  3 x 20s by PT   Manual Therapy  posterior humeral glide R G3, then combined with IR stretching in varying range (with sustained glide, no longer pain with passive IR) (8')  Passive scapular stretching medial and inferior in SL on R   Manual Therapy  posterior humeral glide R G3, then combined with IR stretching in varying range and ER stretching  UT stretch in supine by PT  PPIVM R and L rotation lower cervical     Therapeutic Exercise:   RS 90 flexion 3 x 20s  Band IR 2 x 10   Band ER RTB 2 x 10  Prone horizontal abduction trial-held, could not scapular retract, pain with attempt horizontal abduction  Self upper trap stretch 3 x 20s  Pec minor doorway stretch 20s x 3   Scaption with YTB to bias ER 2 x 10   Rows blue tubing 2 x 12  Therapeutic Exercise:   SL ER with scap setting 2# x 15, 3# x 12   SL horizontal abd 2# with scap setting by PT  SL scaption 2# 2 x 10   Supine horizontal abduction YTB 2 x 12   Wall serratus press with max tactile cues x 1.5'  Vertical wall  walks YTB  5x        Therapeutic Exercise:   SL ER 3# 2 x 10  Supine horizontal abduction RTB 2 x 12  Lateral wall walks YTB 8 steps, x 3 set  IR RTB 2 x 12  Serratus plus only portion at wall 10x, min cuing prn to prevent elevation   Arm propped in scaption plane ER 3# 2 x 10   Scapular adduction trials in front of mirror, working on prevention hiking 5s holds                        Charges: Manual x 1 (15'); TE x 2 (24) Total Timed Treatment: 39 min  Total Treatment Time: 39 min

## 2024-09-17 NOTE — TELEPHONE ENCOUNTER
Endocrine refill protocol for basal insulins     Protocol Criteria: PASSED Reason: N/A  - Appointment with Endocrinology completed in the last 6 months or scheduled in the next 3 months    - A1c result completed in the last 6 months and is below 8.5%     Verify appointment has been completed or scheduled in the appropriate timeline. If so can send a 90 day supply with 1 refill per provider protocol.    Verify A1c has been completed within the last 6 months and is below 8.5%     Last completed office visit:9/3/2024 Opal Bailey MD   Next scheduled Follow up: no future appt     Last A1c result: Last A1c value was 6.8% done 9/3/2024.    Rest, drink plenty of fluids  Take ibuprofen and tylenol for pain and fever  Follow up with family practice,   Return to the emergency room if symptoms worsen

## 2024-09-19 ENCOUNTER — OFFICE VISIT (OUTPATIENT)
Dept: PHYSICAL THERAPY | Facility: HOSPITAL | Age: 74
End: 2024-09-19
Attending: FAMILY MEDICINE
Payer: MEDICARE

## 2024-09-19 PROCEDURE — 97140 MANUAL THERAPY 1/> REGIONS: CPT

## 2024-09-19 PROCEDURE — 97110 THERAPEUTIC EXERCISES: CPT

## 2024-09-19 NOTE — PROGRESS NOTES
Diagnosis:    Chronic right shoulder pain (M25.511,G89.29)         Referring Provider: Elidia Valdez  Date of Evaluation:    9/5/24    Precautions:  None Next MD visit:   none scheduled  Date of Surgery: n/a   Insurance Primary/Secondary: MEDICARE / MEDICAID     # Auth Visits: 10 per POC            Subjective: Has been practicing in front of mirror as discussed.     Pain: 0/10 at rest       Objective:     (9/17/24) R arm at 90 abd, IR 85*; *    Accessory motion: reduced into R rotation with PPIVM middle and lower cervical    Cervical ROM:(*denotes pain)  Eval   Flexion(0-45): WNL   Extension(0-45/60): mildly reduced but non-painful  Side flexion (0-45): R 25; L 12  Rotation(0-60/80): R 50; L 60         Strength/MMT: (* denotes performed with pain)  Shoulder Elbow Scapular   Flexion: R 5/5; L 5/5  Abduction: R 4*/5; L 5/5  ER: R 4-*/5; L 4/5  In 90 flexion: R pain and weak; L 4/5   IR: R 4*/5; L 5/5  In 90 flexion: R pain and weak; 5/5 Flexion: R 5/5; L 5/5  Extension: R 5/5; L 5/5    TBT         Assessment:  Patient still requires a lot of cuing for form and for scapular dyskinesia. Patient will be going to [facility] x 20 days, so requests further ex to work on. Review of HEP. Focus on rotator cuff strengthening as well as periscapular. Patient still adopts protracted shoulder position on R and sits more anterior in GH joint. Manual to improve posterior capsule mobility as well as scapular mobility. Notable continued greater scapular winging on R as well. Will follow up when returns.     Patient was instructed in and issued a HEP for:   Access Code: [email or web address]  Exercises  - Seated Upper Trapezius Stretch  - 1-2 x daily - 7 x weekly - 3 sets - 20 hold  - Sidelying Shoulder ER with Towel and Dumbbell  - 1 x daily - 5 x weekly - 2 sets - 10-15 reps  - Horizontal Wall Walk with Resistance  - 1 x daily - 5 x weekly - 2-3 sets - 8 reps  - Supine Shoulder Horizontal Abduction with Resistance  - 1 x daily - 5 x weekly - 2  sets - 12 reps  - Shoulder Internal Rotation with Resistance  - 1 x daily - 5 x weekly - 2 sets - 10 reps  - Shoulder External Rotation with Anchored Resistance  - 1 x daily - 5 x weekly - 2 sets - 10 reps  - Standing Row with Anchored Resistance  - 1 x daily - 5 x weekly - 2 sets - 15 reps    Goals:  (to be met in 10 visits)   Pt will report improved ability to sleep without waking due to shoulder pain  Pt will improve shoulder strength throughout to 4+/5 to improve function with ADLs including reaching for the seatbelt  (progressing)  Pt will improve Quickdash score by 5% to demonstrate overall improvement in function.   Pt will be independent and compliant with comprehensive HEP to maintain progress achieved in PT    Plan: re-assessment upon return from [facility]   Date: 9/9/2024  TX#: 2/10 Date: 9/12/2024               TX#: 3/ Date:   9/17/2024            TX#: 4/ Date:     9/19/24            TX#: 5/ Date:   Tx#: 6/   Manual Therapy: posterior humeral glide R G3, then combined with IR stretching in varying range (with sustained glide, no longer pain with passive IR) (8')  PVVIM cervical rotation into R G3   Downglides R C5/6 G3  UT stretch  3 x 20s by PT   Manual Therapy  posterior humeral glide R G3, then combined with IR stretching in varying range (with sustained glide, no longer pain with passive IR) (8')  Passive scapular stretching medial and inferior in SL on R   Manual Therapy  posterior humeral glide R G3, then combined with IR stretching in varying range and ER stretching  UT stretch in supine by PT  PPIVM R and L rotation lower cervical Manual Therapy  posterior humeral glide R G3, then combined with IR stretching in varying range and ER stretching  Passive scapular stretching medial and inferior in SL on R        Therapeutic Exercise:   RS 90 flexion 3 x 20s  Band IR 2 x 10   Band ER RTB 2 x 10  Prone horizontal abduction trial-held, could not scapular retract, pain with attempt horizontal abduction  Self  upper trap stretch 3 x 20s  Pec minor doorway stretch 20s x 3   Scaption with YTB to bias ER 2 x 10   Rows blue tubing 2 x 12  Therapeutic Exercise:   SL ER with scap setting 2# x 15, 3# x 12   SL horizontal abd 2# with scap setting by PT  SL scaption 2# 2 x 10   Supine horizontal abduction YTB 2 x 12   Wall serratus press with max tactile cues x 1.5'  Vertical wall walks YTB  5x        Therapeutic Exercise:   SL ER 3# 2 x 10  Supine horizontal abduction RTB 2 x 12  Lateral wall walks YTB 8 steps, x 3 set  IR RTB 2 x 12  Serratus plus only portion at wall 10x, min cuing prn to prevent elevation   Arm propped in scaption plane ER 3# 2 x 10   Scapular adduction trials in front of mirror, working on prevention hiking 5s holds  Therapeutic Exercise:   RTB IR arm at side towel 2 x 10  RTB ER arm at side towel 2 x 10   CW and CCW 5 ea, 3 sets   Rows RTB 2 x 15, max tactile cues initially, taper to min  Scap taps 3 directions YTB 2 x 5 ea   Serratus wall slides with YTB 8x , 5x                     15' late (9/19/2024)  Charges: Manual x 1 (8'); TE x 1 (19) Total Timed Treatment: 27 min  Total Treatment Time: 27 min

## 2024-10-15 ENCOUNTER — TELEPHONE (OUTPATIENT)
Dept: PHYSICAL THERAPY | Facility: HOSPITAL | Age: 74
End: 2024-10-15

## 2024-10-15 ENCOUNTER — APPOINTMENT (OUTPATIENT)
Dept: PHYSICAL THERAPY | Facility: HOSPITAL | Age: 74
End: 2024-10-15
Attending: FAMILY MEDICINE
Payer: MEDICARE

## 2024-10-18 ENCOUNTER — OFFICE VISIT (OUTPATIENT)
Facility: LOCATION | Age: 74
End: 2024-10-18
Payer: MEDICARE

## 2024-10-18 DIAGNOSIS — M79.671 BILATERAL FOOT PAIN: ICD-10-CM

## 2024-10-18 DIAGNOSIS — E11.42 DIABETIC POLYNEUROPATHY ASSOCIATED WITH TYPE 2 DIABETES MELLITUS (HCC): ICD-10-CM

## 2024-10-18 DIAGNOSIS — M79.672 BILATERAL FOOT PAIN: ICD-10-CM

## 2024-10-18 DIAGNOSIS — M21.961 DEFORMITY OF BOTH FEET: ICD-10-CM

## 2024-10-18 DIAGNOSIS — L84 PRE-ULCERATIVE CALLUSES: Primary | ICD-10-CM

## 2024-10-18 DIAGNOSIS — M20.11 HALLUX VALGUS, RIGHT: ICD-10-CM

## 2024-10-18 DIAGNOSIS — M21.962 DEFORMITY OF BOTH FEET: ICD-10-CM

## 2024-10-18 PROCEDURE — 99213 OFFICE O/P EST LOW 20 MIN: CPT | Performed by: PODIATRIST
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
## 2024-10-24 ENCOUNTER — OFFICE VISIT (OUTPATIENT)
Dept: PHYSICAL THERAPY | Facility: HOSPITAL | Age: 74
End: 2024-10-24
Attending: FAMILY MEDICINE
Payer: MEDICARE

## 2024-10-24 PROCEDURE — 97112 NEUROMUSCULAR REEDUCATION: CPT

## 2024-10-24 PROCEDURE — 97110 THERAPEUTIC EXERCISES: CPT

## 2024-10-24 NOTE — PROGRESS NOTES
Diagnosis:    Chronic right shoulder pain (M25.511,G89.29)         Referring Provider: Elidia Valdez  Date of Evaluation:    9/5/24    Precautions:  None Next MD visit:   none scheduled  Date of Surgery: n/a   Insurance Primary/Secondary: MEDICARE / MEDICAID     # Auth Visits: 10 per POC            Subjective: So far so good at shoulder. Occasionally can have pain if reaching behind head. Reports had stiff neck after traveling and feels at time. Use of cream and that helps to relax it.   Reports fair HEP compliance, was in [place] with family. Resumed his exercises since getting home.     Pain: 0/10 at rest ; 5/10 when goes into the behind the head position      Objective:     (9/17/24) R arm at 90 abd, IR 85*; *      Cervical ROM:(*denotes pain)  10/24/2024 Eval   Flexion: WNL  Extension: 40   Rotation: R 42; L 55  (to 52 after manual)  Side flexion R 25; L 12  Flexion(0-45): WNL   Extension(0-45/60): mildly reduced but non-painful  Side flexion (0-45): R 25; L 12  Rotation(0-60/80): R 50; L 60         Strength/MMT: (* denotes performed with pain)  Shoulder Scapular   Flexion: R 5/5; L 5/5  Abduction: R 5*/5; L 5/5  ER: R 4+*/5; L 4/5  In 90 flexion: R pain and weak; L 4/5   IR: R 5*/5; L 5/5  In 90 flexion: R pain and weak; 5/5 Latissimus dorsi: R 4*/5; L 5/5  Middle trapezius: 4- B  Rhomboids: R 4+; L 4         Assessment:  Returns after 1 month d/t trip to [facility]. Has been doing his HEP some. Demonstrates improved strength at rotator cuff though still mild pain to resisted testing, as well as with abduction. No pain in straight planes motion, but if reaches up and overhead still has posterior shoulder pain. Able to reproduce with strengthening activities overhead but not painful. Still with scapular dyskinesia and cuing throughout. Neck motions remain limited. Positive response with 12 dg improvement R rotation after MWM at C5/6. Instructed in self towel rotation.     Patient was instructed in and issued a HEP  for:   Access Code: [email or web address]    Exercises  - Seated Upper Trapezius Stretch  - 1-2 x daily - 7 x weekly - 3 sets - 20 hold  - Seated Assisted Cervical Rotation with Towel  - 1 x daily - 7 x weekly - 2 sets - 10 reps  - Horizontal Wall Walk with Resistance  - 1 x daily - 5 x weekly - 2-3 sets - 8 reps  - Shoulder Internal Rotation with Resistance  - 1 x daily - 5 x weekly - 2 sets - 10 reps  - Shoulder External Rotation with Anchored Resistance  - 1 x daily - 5 x weekly - 2 sets - 10 reps  - Seated Lat Pull Down with Resistance - Elbows Bent  - 1 x daily - 5 x weekly - 3 sets - 10 reps  - Standing Row with Anchored Resistance  - 1 x daily - 5 x weekly - 2 sets - 15 reps    Goals:  (to be met in 10 visits)   Pt will report improved ability to sleep without waking due to shoulder pain  Pt will improve shoulder strength throughout to 4+/5 to improve function with ADLs including reaching for the seatbelt  (progressing)  Pt will improve Quickdash score by 5% to demonstrate overall improvement in function.   Pt will be independent and compliant with comprehensive HEP to maintain progress achieved in PT    Plan: re-assessment upon return from [facility]   Date: 9/9/2024  TX#: 2/10 Date: 9/12/2024               TX#: 3/ Date:   9/17/2024            TX#: 4/ Date:     9/19/24            TX#: 5/ Date: 10/24/2024  Tx#: 6/   Manual Therapy: posterior humeral glide R G3, then combined with IR stretching in varying range (with sustained glide, no longer pain with passive IR) (8')  PVVIM cervical rotation into R G3   Downglides R C5/6 G3  UT stretch  3 x 20s by PT   Manual Therapy  posterior humeral glide R G3, then combined with IR stretching in varying range (with sustained glide, no longer pain with passive IR) (8')  Passive scapular stretching medial and inferior in SL on R   Manual Therapy  posterior humeral glide R G3, then combined with IR stretching in varying range and ER stretching  UT stretch in supine by PT  PPIVM R and L  rotation lower cervical Manual Therapy  posterior humeral glide R G3, then combined with IR stretching in varying range and ER stretching  Passive scapular stretching medial and inferior in SL on R     Manual Therapy  MWM into R rotation C6 10x (with improved AROM following)  MWM into R rotation C5 10x    Therapeutic Exercise:   RS 90 flexion 3 x 20s  Band IR 2 x 10   Band ER RTB 2 x 10  Prone horizontal abduction trial-held, could not scapular retract, pain with attempt horizontal abduction  Self upper trap stretch 3 x 20s  Pec minor doorway stretch 20s x 3   Scaption with YTB to bias ER 2 x 10   Rows blue tubing 2 x 12  Therapeutic Exercise:   SL ER with scap setting 2# x 15, 3# x 12   SL horizontal abd 2# with scap setting by PT  SL scaption 2# 2 x 10   Supine horizontal abduction YTB 2 x 12   Wall serratus press with max tactile cues x 1.5'  Vertical wall walks YTB  5x        Therapeutic Exercise:   SL ER 3# 2 x 10  Supine horizontal abduction RTB 2 x 12  Lateral wall walks YTB 8 steps, x 3 set  IR RTB 2 x 12  Serratus plus only portion at wall 10x, min cuing prn to prevent elevation   Arm propped in scaption plane ER 3# 2 x 10   Scapular adduction trials in front of mirror, working on prevention hiking 5s holds  Therapeutic Exercise:   RTB IR arm at side towel 2 x 10  RTB ER arm at side towel 2 x 10   CW and CCW 5 ea, 3 sets   Rows RTB 2 x 15, max tactile cues initially, taper to min  Scap taps 3 directions YTB 2 x 5 ea   Serratus wall slides with YTB 8x , 5x  Therapeutic Exercise:   MMT  Prone horizontal abduction thumb up 2 x 10, 2s hold   Self towel rotation R 2 x 10  Serratus foam roll at wall 3 x 10    Neuro Re-Ed:  Ball throw at wall into flexion 8x 1#  Ball taps 90 ER 1# 3 x 5                      Charges: TE x 2(32'); NR x 1 (8') Total Timed Treatment: 41 min  Total Treatment Time: 41 min

## 2024-10-27 DIAGNOSIS — E11.65 UNCONTROLLED TYPE 2 DIABETES MELLITUS WITH HYPERGLYCEMIA (HCC): ICD-10-CM

## 2024-10-28 RX ORDER — PEN NEEDLE, DIABETIC 29 G X1/2"
NEEDLE, DISPOSABLE MISCELLANEOUS
Qty: 100 EACH | Refills: 1 | Status: SHIPPED | OUTPATIENT
Start: 2024-10-28

## 2024-10-29 NOTE — PROGRESS NOTES
Diagnosis:    Chronic right shoulder pain (M25.511,G89.29)         Referring Provider: Elidia Valdez  Date of Evaluation:    9/5/24    Precautions:  None Next MD visit:   none scheduled  Date of Surgery: n/a   Insurance Primary/Secondary: MEDICARE / MEDICAID     # Auth Visits: 10 per POC            Subjective: So far so good at shoulder. Occasionally can have pain if reaching behind head. Reports had stiff neck after traveling and feels at time. Use of cream and that helps to relax it.   Reports fair HEP compliance, was in [place] with family. Resumed his exercises since getting home.     Pain: 0/10 at rest ; 5/10 when goes into the behind the head position      Objective:     (9/17/24) R arm at 90 abd, IR 85*; *      Cervical ROM:(*denotes pain)  10/24/2024 Eval   Flexion: WNL  Extension: 40   Rotation: R 42; L 55  (to 52 after manual)  Side flexion R 25; L 12  Flexion(0-45): WNL   Extension(0-45/60): mildly reduced but non-painful  Side flexion (0-45): R 25; L 12  Rotation(0-60/80): R 50; L 60         Strength/MMT: (* denotes performed with pain)  Shoulder Scapular   Flexion: R 5/5; L 5/5  Abduction: R 5*/5; L 5/5  ER: R 4+*/5; L 4/5  In 90 flexion: R pain and weak; L 4/5   IR: R 5*/5; L 5/5  In 90 flexion: R pain and weak; 5/5 Latissimus dorsi: R 4*/5; L 5/5  Middle trapezius: 4- B  Rhomboids: R 4+; L 4         Assessment:  Returns after 1 month d/t trip to [facility]. Has been doing his HEP some. Demonstrates improved strength at rotator cuff though still mild pain to resisted testing, as well as with abduction. No pain in straight planes motion, but if reaches up and overhead still has posterior shoulder pain. Able to reproduce with strengthening activities overhead but not painful. Still with scapular dyskinesia and cuing throughout. Neck motions remain limited. Positive response with 12 dg improvement R rotation after MWM at C5/6. Instructed in self towel rotation.     Patient was instructed in and issued a HEP  for:   Access Code: [email or web address]    Exercises  - Seated Upper Trapezius Stretch  - 1-2 x daily - 7 x weekly - 3 sets - 20 hold  - Seated Assisted Cervical Rotation with Towel  - 1 x daily - 7 x weekly - 2 sets - 10 reps  - Horizontal Wall Walk with Resistance  - 1 x daily - 5 x weekly - 2-3 sets - 8 reps  - Shoulder Internal Rotation with Resistance  - 1 x daily - 5 x weekly - 2 sets - 10 reps  - Shoulder External Rotation with Anchored Resistance  - 1 x daily - 5 x weekly - 2 sets - 10 reps  - Seated Lat Pull Down with Resistance - Elbows Bent  - 1 x daily - 5 x weekly - 3 sets - 10 reps  - Standing Row with Anchored Resistance  - 1 x daily - 5 x weekly - 2 sets - 15 reps    Goals:  (to be met in 10 visits)   Pt will report improved ability to sleep without waking due to shoulder pain  Pt will improve shoulder strength throughout to 4+/5 to improve function with ADLs including reaching for the seatbelt  (progressing)  Pt will improve Quickdash score by 5% to demonstrate overall improvement in function.   Pt will be independent and compliant with comprehensive HEP to maintain progress achieved in PT    Plan: re-assessment upon return from [facility]   Date: 9/9/2024  TX#: 2/10 Date: 9/12/2024               TX#: 3/ Date:   9/17/2024            TX#: 4/ Date:     9/19/24            TX#: 5/ Date: 10/24/2024  Tx#: 6/ 10/31/24  7/   Manual Therapy: posterior humeral glide R G3, then combined with IR stretching in varying range (with sustained glide, no longer pain with passive IR) (8')  PVVIM cervical rotation into R G3   Downglides R C5/6 G3  UT stretch  3 x 20s by PT   Manual Therapy  posterior humeral glide R G3, then combined with IR stretching in varying range (with sustained glide, no longer pain with passive IR) (8')  Passive scapular stretching medial and inferior in SL on R   Manual Therapy  posterior humeral glide R G3, then combined with IR stretching in varying range and ER stretching  UT stretch in supine by PT  PPIVM  R and L rotation lower cervical Manual Therapy  posterior humeral glide R G3, then combined with IR stretching in varying range and ER stretching  Passive scapular stretching medial and inferior in SL on R     Manual Therapy  MWM into R rotation C6 10x (with improved AROM following)  MWM into R rotation C5 10x  Manual Therapy  ***   Therapeutic Exercise:   RS 90 flexion 3 x 20s  Band IR 2 x 10   Band ER RTB 2 x 10  Prone horizontal abduction trial-held, could not scapular retract, pain with attempt horizontal abduction  Self upper trap stretch 3 x 20s  Pec minor doorway stretch 20s x 3   Scaption with YTB to bias ER 2 x 10   Rows blue tubing 2 x 12  Therapeutic Exercise:   SL ER with scap setting 2# x 15, 3# x 12   SL horizontal abd 2# with scap setting by PT  SL scaption 2# 2 x 10   Supine horizontal abduction YTB 2 x 12   Wall serratus press with max tactile cues x 1.5'  Vertical wall walks YTB  5x        Therapeutic Exercise:   SL ER 3# 2 x 10  Supine horizontal abduction RTB 2 x 12  Lateral wall walks YTB 8 steps, x 3 set  IR RTB 2 x 12  Serratus plus only portion at wall 10x, min cuing prn to prevent elevation   Arm propped in scaption plane ER 3# 2 x 10   Scapular adduction trials in front of mirror, working on prevention hiking 5s holds  Therapeutic Exercise:   RTB IR arm at side towel 2 x 10  RTB ER arm at side towel 2 x 10   CW and CCW 5 ea, 3 sets   Rows RTB 2 x 15, max tactile cues initially, taper to min  Scap taps 3 directions YTB 2 x 5 ea   Serratus wall slides with YTB 8x , 5x  Therapeutic Exercise:   MMT  Prone horizontal abduction thumb up 2 x 10, 2s hold   Self towel rotation R 2 x 10  Serratus foam roll at wall 3 x 10    Neuro Re-Ed:  Ball throw at wall into flexion 8x 1#  Ball taps 90 ER 1# 3 x 5  Therapeutic Exercise:   Prone horizontal abduction thumb up 2 x 10, 2s hold   Vertical wall walks ***                       Charges: TE x 2(32'); NR x 1 (8') Total Timed Treatment: 41 min  Total  Treatment Time: 41 min

## 2024-10-31 ENCOUNTER — TELEPHONE (OUTPATIENT)
Dept: PHYSICAL THERAPY | Facility: HOSPITAL | Age: 74
End: 2024-10-31

## 2024-10-31 ENCOUNTER — APPOINTMENT (OUTPATIENT)
Dept: PHYSICAL THERAPY | Facility: HOSPITAL | Age: 74
End: 2024-10-31
Attending: FAMILY MEDICINE
Payer: MEDICARE

## 2024-11-05 ENCOUNTER — OFFICE VISIT (OUTPATIENT)
Dept: PHYSICAL THERAPY | Facility: HOSPITAL | Age: 74
End: 2024-11-05
Attending: FAMILY MEDICINE
Payer: MEDICARE

## 2024-11-05 PROCEDURE — 97140 MANUAL THERAPY 1/> REGIONS: CPT

## 2024-11-05 PROCEDURE — 97110 THERAPEUTIC EXERCISES: CPT

## 2024-11-05 PROCEDURE — 97112 NEUROMUSCULAR REEDUCATION: CPT

## 2024-11-05 NOTE — PROGRESS NOTES
Diagnosis:    Chronic right shoulder pain (M25.511,G89.29)         Referring Provider: Elidia Valdez  Date of Evaluation:    9/5/24    Precautions:  None Next MD visit:   none scheduled  Date of Surgery: n/a   Insurance Primary/Secondary: MEDICARE / MEDICAID     # Auth Visits: 10 per POC            Subjective: \"it is okay, so far so good. Sometime but it pains.\"    Pain: 0/10 at rest ; 4/10 when reaches out behind him       Objective:     11/5/2024   R arm at 90 abd, IR 90*; *      Cervical ROM:(*denotes pain)  11/5/2024 10/24/2024 Eval   Rotation: R 52; L 62 Flexion: WNL  Extension: 40   Rotation: R 42; L 55  (to 52 after manual)  Side flexion R 25; L 12  Flexion(0-45): WNL   Extension(0-45/60): mildly reduced but non-painful  Side flexion (0-45): R 25; L 12  Rotation(0-60/80): R 50; L 60         Strength/MMT: (* denotes performed with pain) 10/24/24  Shoulder Scapular   Flexion: R 5/5; L 5/5  Abduction: R 5*/5; L 5/5  ER: R 4+*/5; L 4/5  In 90 flexion: R pain and weak; L 4/5   IR: R 5*/5; L 5/5  In 90 flexion: R pain and weak; 5/5 Latissimus dorsi: R 4*/5; L 5/5  Middle trapezius: 4- B  Rhomboids: R 4+; L 4         Assessment:  Still with pain with max horizontal abduction, in scaption plane. End of session, non-painful with this motion. Continued focus on rotator cuff strengthening overhead and periscapular control as well as neuro-re-ed stabilization. Continues to have positive response with MWM P-A cervical into rotation. Maintained gains from last session here as well.     Patient was instructed in and issued a HEP for:   Access Code: [email or web address]    Exercises  - Seated Upper Trapezius Stretch  - 1-2 x daily - 7 x weekly - 3 sets - 20 hold  - Seated Assisted Cervical Rotation with Towel  - 1 x daily - 7 x weekly - 2 sets - 10 reps  - Horizontal Wall Walk with Resistance  - 1 x daily - 5 x weekly - 2-3 sets - 8 reps  - Shoulder Internal Rotation with Resistance  - 1 x daily - 5 x weekly - 2 sets - 10 reps  -  Shoulder External Rotation with Anchored Resistance  - 1 x daily - 5 x weekly - 2 sets - 10 reps  - Seated Lat Pull Down with Resistance - Elbows Bent  - 1 x daily - 5 x weekly - 3 sets - 10 reps  - Standing Row with Anchored Resistance  - 1 x daily - 5 x weekly - 2 sets - 15 reps    Goals:  (to be met in 10 visits)   Pt will report improved ability to sleep without waking due to shoulder pain  Pt will improve shoulder strength throughout to 4+/5 to improve function with ADLs including reaching for the seatbelt  (progressing)  Pt will improve Quickdash score by 5% to demonstrate overall improvement in function.   Pt will be independent and compliant with comprehensive HEP to maintain progress achieved in PT    Plan: con  Date: 9/9/2024  TX#: 2/10 Date: 9/12/2024               TX#: 3/ Date:   9/17/2024            TX#: 4/ Date:     9/19/24            TX#: 5/ Date: 10/24/2024  Tx#: 6/ 11/5/2024  7/   Manual Therapy: posterior humeral glide R G3, then combined with IR stretching in varying range (with sustained glide, no longer pain with passive IR) (8')  PVVIM cervical rotation into R G3   Downglides R C5/6 G3  UT stretch  3 x 20s by PT   Manual Therapy  posterior humeral glide R G3, then combined with IR stretching in varying range (with sustained glide, no longer pain with passive IR) (8')  Passive scapular stretching medial and inferior in SL on R   Manual Therapy  posterior humeral glide R G3, then combined with IR stretching in varying range and ER stretching  UT stretch in supine by PT  PPIVM R and L rotation lower cervical Manual Therapy  posterior humeral glide R G3, then combined with IR stretching in varying range and ER stretching  Passive scapular stretching medial and inferior in SL on R     Manual Therapy  MWM into R rotation C6 10x (with improved AROM following)  MWM into R rotation C5 10x  Manual Therapy  posterior humeral glide R G3 both in abd and at 90 flexion with force through elbow, then combined  with ER (improved painfree to 105)  MWM into R rotation C5 10x   MWM into R rotation C6 10x    Therapeutic Exercise:   RS 90 flexion 3 x 20s  Band IR 2 x 10   Band ER RTB 2 x 10  Prone horizontal abduction trial-held, could not scapular retract, pain with attempt horizontal abduction  Self upper trap stretch 3 x 20s  Pec minor doorway stretch 20s x 3   Scaption with YTB to bias ER 2 x 10   Rows blue tubing 2 x 12  Therapeutic Exercise:   SL ER with scap setting 2# x 15, 3# x 12   SL horizontal abd 2# with scap setting by PT  SL scaption 2# 2 x 10   Supine horizontal abduction YTB 2 x 12   Wall serratus press with max tactile cues x 1.5'  Vertical wall walks YTB  5x        Therapeutic Exercise:   SL ER 3# 2 x 10  Supine horizontal abduction RTB 2 x 12  Lateral wall walks YTB 8 steps, x 3 set  IR RTB 2 x 12  Serratus plus only portion at wall 10x, min cuing prn to prevent elevation   Arm propped in scaption plane ER 3# 2 x 10   Scapular adduction trials in front of mirror, working on prevention hiking 5s holds  Therapeutic Exercise:   RTB IR arm at side towel 2 x 10  RTB ER arm at side towel 2 x 10   CW and CCW 5 ea, 3 sets   Rows RTB 2 x 15, max tactile cues initially, taper to min  Scap taps 3 directions YTB 2 x 5 ea   Serratus wall slides with YTB 8x , 5x  Therapeutic Exercise:   MMT  Prone horizontal abduction thumb up 2 x 10, 2s hold   Self towel rotation R 2 x 10  Serratus foam roll at wall 3 x 10    Neuro Re-Ed:  Ball throw at wall into flexion 8x 1#  Ball taps 90 ER 1# 3 x 5  Therapeutic Exercise:   Prone ER propped 90 abd 10x, 1#  x 10, 2# x 10    Prone horizontal abduction neutral wrist 1# 2 x 10, 2s hold  Prone horizontal thumb up 1# 10x   Serratus foam roll at wall 2 x 12     Neuro Re-Ed:  Ball throw at wall into flexion 8x2 1#  Ball throw out into scaption at wall 8x2 1#   Ball taps 90 ER 1# 3 x 8, tactile cues directionality   Scap taps up and lateral 2 x 8 ea                        Charges: TE x 1  (15'); NR x 1 (15') Manual x 1 (10') Total Timed Treatment: 40 min  Total Treatment Time: 40 min

## 2024-11-08 ENCOUNTER — LAB ENCOUNTER (OUTPATIENT)
Dept: LAB | Age: 74
End: 2024-11-08
Attending: INTERNAL MEDICINE
Payer: MEDICARE

## 2024-11-08 DIAGNOSIS — Z79.4 TYPE 2 DIABETES MELLITUS WITH OTHER SPECIFIED COMPLICATION, WITH LONG-TERM CURRENT USE OF INSULIN (HCC): ICD-10-CM

## 2024-11-08 DIAGNOSIS — E11.69 TYPE 2 DIABETES MELLITUS WITH OTHER SPECIFIED COMPLICATION, WITH LONG-TERM CURRENT USE OF INSULIN (HCC): ICD-10-CM

## 2024-11-08 DIAGNOSIS — E78.5 DYSLIPIDEMIA: ICD-10-CM

## 2024-11-08 LAB
ALBUMIN SERPL-MCNC: 4.4 G/DL (ref 3.2–4.8)
ALBUMIN/GLOB SERPL: 1.5 {RATIO} (ref 1–2)
ALP LIVER SERPL-CCNC: 76 U/L
ALT SERPL-CCNC: 21 U/L
ANION GAP SERPL CALC-SCNC: 4 MMOL/L (ref 0–18)
AST SERPL-CCNC: 23 U/L (ref ?–34)
BILIRUB SERPL-MCNC: 1.5 MG/DL (ref 0.2–1.1)
BUN BLD-MCNC: 17 MG/DL (ref 9–23)
CALCIUM BLD-MCNC: 10.3 MG/DL (ref 8.7–10.4)
CHLORIDE SERPL-SCNC: 105 MMOL/L (ref 98–112)
CHOLEST SERPL-MCNC: 159 MG/DL (ref ?–200)
CO2 SERPL-SCNC: 30 MMOL/L (ref 21–32)
CREAT BLD-MCNC: 1.08 MG/DL
EGFRCR SERPLBLD CKD-EPI 2021: 72 ML/MIN/1.73M2 (ref 60–?)
FASTING PATIENT LIPID ANSWER: YES
FASTING STATUS PATIENT QL REPORTED: YES
GLOBULIN PLAS-MCNC: 3 G/DL (ref 2–3.5)
GLUCOSE BLD-MCNC: 100 MG/DL (ref 70–99)
HDLC SERPL-MCNC: 35 MG/DL (ref 40–59)
LDLC SERPL CALC-MCNC: 101 MG/DL (ref ?–100)
NONHDLC SERPL-MCNC: 124 MG/DL (ref ?–130)
OSMOLALITY SERPL CALC.SUM OF ELEC: 290 MOSM/KG (ref 275–295)
POTASSIUM SERPL-SCNC: 4.4 MMOL/L (ref 3.5–5.1)
PROT SERPL-MCNC: 7.4 G/DL (ref 5.7–8.2)
SODIUM SERPL-SCNC: 139 MMOL/L (ref 136–145)
TRIGL SERPL-MCNC: 125 MG/DL (ref 30–149)
VLDLC SERPL CALC-MCNC: 21 MG/DL (ref 0–30)

## 2024-11-08 PROCEDURE — 80053 COMPREHEN METABOLIC PANEL: CPT

## 2024-11-08 PROCEDURE — 36415 COLL VENOUS BLD VENIPUNCTURE: CPT

## 2024-11-08 PROCEDURE — 80061 LIPID PANEL: CPT

## 2024-12-10 ENCOUNTER — TELEPHONE (OUTPATIENT)
Dept: PHYSICAL THERAPY | Facility: HOSPITAL | Age: 74
End: 2024-12-10

## 2025-02-04 ENCOUNTER — OFFICE VISIT (OUTPATIENT)
Dept: ENDOCRINOLOGY CLINIC | Facility: CLINIC | Age: 75
End: 2025-02-04
Payer: MEDICARE

## 2025-02-04 VITALS
HEIGHT: 68 IN | BODY MASS INDEX: 19.4 KG/M2 | HEART RATE: 52 BPM | WEIGHT: 128 LBS | DIASTOLIC BLOOD PRESSURE: 74 MMHG | SYSTOLIC BLOOD PRESSURE: 142 MMHG

## 2025-02-04 DIAGNOSIS — E78.5 DYSLIPIDEMIA: Primary | ICD-10-CM

## 2025-02-04 DIAGNOSIS — E11.69 TYPE 2 DIABETES MELLITUS WITH OTHER SPECIFIED COMPLICATION, WITH LONG-TERM CURRENT USE OF INSULIN (HCC): ICD-10-CM

## 2025-02-04 DIAGNOSIS — E78.00 HYPERCHOLESTEREMIA: ICD-10-CM

## 2025-02-04 DIAGNOSIS — Z79.4 TYPE 2 DIABETES MELLITUS WITH OTHER SPECIFIED COMPLICATION, WITH LONG-TERM CURRENT USE OF INSULIN (HCC): ICD-10-CM

## 2025-02-04 LAB
GLUCOSE BLOOD: 177
HEMOGLOBIN A1C: 7.2 % (ref 4.3–5.6)
TEST STRIP LOT #: NORMAL NUMERIC

## 2025-02-04 PROCEDURE — 82947 ASSAY GLUCOSE BLOOD QUANT: CPT | Performed by: INTERNAL MEDICINE

## 2025-02-04 PROCEDURE — 99213 OFFICE O/P EST LOW 20 MIN: CPT | Performed by: INTERNAL MEDICINE

## 2025-02-04 PROCEDURE — 83036 HEMOGLOBIN GLYCOSYLATED A1C: CPT | Performed by: INTERNAL MEDICINE

## 2025-02-04 RX ORDER — ATORVASTATIN CALCIUM 10 MG/1
10 TABLET, FILM COATED ORAL NIGHTLY
Qty: 90 TABLET | Refills: 1 | Status: SHIPPED | OUTPATIENT
Start: 2025-02-04

## 2025-02-04 RX ORDER — PEN NEEDLE, DIABETIC 29 G X1/2"
NEEDLE, DISPOSABLE MISCELLANEOUS
Qty: 100 EACH | Refills: 1 | Status: SHIPPED | OUTPATIENT
Start: 2025-02-04

## 2025-02-04 RX ORDER — INSULIN GLARGINE 100 [IU]/ML
22 INJECTION, SOLUTION SUBCUTANEOUS DAILY
Qty: 19.8 ML | Refills: 1 | Status: SHIPPED | OUTPATIENT
Start: 2025-02-04

## 2025-02-04 RX ORDER — LANCETS
EACH MISCELLANEOUS
Qty: 200 EACH | Refills: 1 | Status: SHIPPED | OUTPATIENT
Start: 2025-02-04
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
## 2025-02-14 ENCOUNTER — OFFICE VISIT (OUTPATIENT)
Facility: LOCATION | Age: 75
End: 2025-02-14
Payer: MEDICARE

## 2025-02-14 DIAGNOSIS — M79.672 BILATERAL FOOT PAIN: ICD-10-CM

## 2025-02-14 DIAGNOSIS — M20.11 HALLUX VALGUS, RIGHT: ICD-10-CM

## 2025-02-14 DIAGNOSIS — E11.42 DIABETIC POLYNEUROPATHY ASSOCIATED WITH TYPE 2 DIABETES MELLITUS (HCC): ICD-10-CM

## 2025-02-14 DIAGNOSIS — M21.961 DEFORMITY OF BOTH FEET: ICD-10-CM

## 2025-02-14 DIAGNOSIS — E08.621 DIABETIC ULCER OF OTHER PART OF RIGHT FOOT ASSOCIATED WITH DIABETES MELLITUS DUE TO UNDERLYING CONDITION, WITH FAT LAYER EXPOSED (HCC): Primary | ICD-10-CM

## 2025-02-14 DIAGNOSIS — L97.512 DIABETIC ULCER OF OTHER PART OF RIGHT FOOT ASSOCIATED WITH DIABETES MELLITUS DUE TO UNDERLYING CONDITION, WITH FAT LAYER EXPOSED (HCC): Primary | ICD-10-CM

## 2025-02-14 DIAGNOSIS — M79.671 BILATERAL FOOT PAIN: ICD-10-CM

## 2025-02-14 DIAGNOSIS — L84 PRE-ULCERATIVE CALLUSES: ICD-10-CM

## 2025-02-14 DIAGNOSIS — L90.9 PLANTAR FAT PAD ATROPHY: ICD-10-CM

## 2025-02-14 DIAGNOSIS — M21.962 DEFORMITY OF BOTH FEET: ICD-10-CM

## 2025-02-14 PROCEDURE — 11042 DBRDMT SUBQ TIS 1ST 20SQCM/<: CPT | Performed by: PODIATRIST

## 2025-02-14 PROCEDURE — 99213 OFFICE O/P EST LOW 20 MIN: CPT | Performed by: PODIATRIST
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
## 2025-02-25 DIAGNOSIS — E11.65 UNCONTROLLED TYPE 2 DIABETES MELLITUS WITH HYPERGLYCEMIA (HCC): ICD-10-CM

## 2025-02-25 RX ORDER — LISINOPRIL 10 MG/1
10 TABLET ORAL DAILY
Qty: 90 TABLET | Refills: 1 | Status: SHIPPED | OUTPATIENT
Start: 2025-02-25

## 2025-02-25 NOTE — TELEPHONE ENCOUNTER
Endocrine Refill protocol for oral antihypertensive medications    Protocol Criteria:  PASSED  Reason: N/A     If all below requirements are met, send a 90-day supply with 1 refill per provider protocol.    Verify appointment with Endocrinology completed in the last 6 months or scheduled in the next 3 months.  Verify BMP or CMP completed in the last 12 months   Verify last GFR result is greater than or equal to 50     Last completed office visit:2/4/2025 Opal Bailey MD   Next scheduled Follow up:   Future Appointments   Date Time Provider Department Center   2/28/2025 10:45 AM Yevgeniy Perry DPM [id number]      Last BMP or CMP completion date:  Lab Results   Component Value Date    GFRAA 79 04/04/2022    GFRNAA 69 04/04/2022    EGFRCR 72 11/08/2024

## 2025-04-18 ENCOUNTER — OFFICE VISIT (OUTPATIENT)
Facility: LOCATION | Age: 75
End: 2025-04-18
Payer: MEDICARE

## 2025-04-18 DIAGNOSIS — L84 PRE-ULCERATIVE CALLUSES: ICD-10-CM

## 2025-04-18 DIAGNOSIS — M77.41 METATARSALGIA OF BOTH FEET: ICD-10-CM

## 2025-04-18 DIAGNOSIS — M20.11 HALLUX VALGUS, RIGHT: ICD-10-CM

## 2025-04-18 DIAGNOSIS — E11.42 DIABETIC POLYNEUROPATHY ASSOCIATED WITH TYPE 2 DIABETES MELLITUS (HCC): ICD-10-CM

## 2025-04-18 DIAGNOSIS — M79.672 BILATERAL FOOT PAIN: ICD-10-CM

## 2025-04-18 DIAGNOSIS — M21.962 DEFORMITY OF BOTH FEET: ICD-10-CM

## 2025-04-18 DIAGNOSIS — L90.9 PLANTAR FAT PAD ATROPHY: ICD-10-CM

## 2025-04-18 DIAGNOSIS — L97.512 DIABETIC ULCER OF OTHER PART OF RIGHT FOOT ASSOCIATED WITH DIABETES MELLITUS DUE TO UNDERLYING CONDITION, WITH FAT LAYER EXPOSED (HCC): Primary | ICD-10-CM

## 2025-04-18 DIAGNOSIS — M77.42 METATARSALGIA OF BOTH FEET: ICD-10-CM

## 2025-04-18 DIAGNOSIS — E08.621 DIABETIC ULCER OF OTHER PART OF RIGHT FOOT ASSOCIATED WITH DIABETES MELLITUS DUE TO UNDERLYING CONDITION, WITH FAT LAYER EXPOSED (HCC): Primary | ICD-10-CM

## 2025-04-18 DIAGNOSIS — M79.671 BILATERAL FOOT PAIN: ICD-10-CM

## 2025-04-18 DIAGNOSIS — M21.961 DEFORMITY OF BOTH FEET: ICD-10-CM

## 2025-04-18 PROCEDURE — 99213 OFFICE O/P EST LOW 20 MIN: CPT | Performed by: PODIATRIST

## 2025-04-24 NOTE — PROGRESS NOTES
[facility]  Progress Note      Luzmaria Phelan is a 74 year old male.   Chief Complaint   Patient presents with    Diabetic Foot Care     F/u Diabetic Foot and callus care, declines pain.  this am.  On 06/16/2025 LOV with PCP Elidia Castro MD  On 2/4/25 A1C= 7.2           HPI:     Patient is a pleasant 74-year-old diabetic male who is returning to clinic today for diabetic footcare.  Patient is denying any pain or recent signs of infection to his feet.  He states that the previous wound is healed up and has no concerns today.  He is hoping to discuss callus and toenail debridement today.  Patient has no other concerns at this time.      Allergies: Patient has no known allergies.   Current Medications[1]   Past Medical History[2]   Past Surgical History[3]   Family History[4]   Social Hx on file[5]        REVIEW OF SYSTEMS:     10 point ROS completed and was negative unless stated in HPI.      EXAM:     GENERAL: well developed, well nourished, in no apparent distress  EXTREMITIES:  1. Integument: Toenails x 10 are of normal morphology and adequate length.  Skin appears dry, warm, and supple. There are no color changes. No open lesions. No macerations.  Hyperkeratotic lesions noted to medial eminence of first MPJ and sub fourth metatarsal head of the right foot, and sub-third metatarsal head of the left foot.  Patient also has HPK noted to lateral aspect of left fourth digit.  No signs of infection bilaterally.  Upon debridement, there is no open ulcerations noted   2. Vascular: Dorsalis pedis 2/4 bilateral and posterior tibial pulses 2/4 bilateral, capillary refill normal.  3. Neurological: Gross sensation intact via light touch bilaterally.  Normal sharp/dull sensation  4. Musculoskeletal: All muscle groups are graded 5/5 in the foot and ankle.  No pain with palpation of bilateral feet today.       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  Chart history reviewed.    Discussed clinical exam findings with patient today and recommend HPK debridement today.    All HPK's listed above of bilateral feet were debrided today utilizing #15 blade without incident.  Patient tolerated well.  There is no open ulcerations or current signs of infection of bilateral feet.    -Patient relates pain relief with intermittent toenail debridements.  -Patient elects for nail debridement today. Toenails 1-5 of the left foot and 1-5 of the right foot were debrided today.  They tolerated procedures well, without incident.    -Instrumentation used includes nail nippers.      -All of the patient's questions and concerns were addressed.  They indicated their understanding of these issues and agrees to the plan.    Time spent reviewing pertinent information from patient's chart, reviewing any pertinent imaging, obtaining history and physical exam, discussing and mutually agreeing on a treatment plan, and documenting encounter: 20 minutes    RTC 6-8 weeks    Jarno Perry DPM, AACFAS        4/24/2025    [facility]  [address] [place] [id number]   [email or web address]            Dragon speech recognition software was used to prepare this note.  Errors in word recognition may occur.  Please contact me with any questions/concerns with this note.        [1]   Current Outpatient Medications   Medication Sig Dispense Refill    lisinopril 10 MG Oral Tab Take 1 tablet (10 mg total) by mouth daily. 90 tablet 1    insulin glargine (LANTUS) 100 UNIT/ML  Subcutaneous Solution Inject 22 Units into the skin daily. 19.8 mL 1    Insulin Syringe-Needle U-100 (BD INSULIN SYRINGE U/F) 30G X 1/2\" 0.5 ML Does not apply Misc Use once daily 100 each 1    metFORMIN HCl 1000 MG Oral Tab Take 1 tablet (1,000 mg total) by mouth 2 (two) times daily with meals. 180 tablet 1    atorvastatin 10 MG Oral Tab Take 1 tablet (10 mg total) by mouth nightly. 90 tablet 1    Microlet Lancets Does not apply Misc CHECK BLOOD SUGAR TWICE DAILY 200 each 1    Glucose Blood (CONTOUR NEXT TEST) In Vitro Strip Check daily 200 each 0    Blood Glucose Monitoring Suppl (CONTOUR NEXT MONITOR) w/Device Does not apply Kit 1 each 2 (two) times daily before meals. 1 kit 0    omeprazole 20 MG Oral Capsule Delayed Release Take 1 capsule (20 mg total) by mouth every morning before breakfast. (Patient taking differently: Take 1 capsule (20 mg total) by mouth as needed.) 30 capsule 0    aspirin 81 MG Oral Tab Take 1 tablet (81 mg total) by mouth daily.     [2]   Past Medical History:   Diabetes (HCC)   [3]   Past Surgical History:  Procedure Laterality Date    Other surgical history      LEFT FOOT CRUSH INJURY AFTER TRAIN DERAILED   [4]   Family History  Problem Relation Age of Onset    Diabetes Father     Lipids Brother     Heart Disorder Neg     Hypertension Neg     Retinal detachment Neg     Strabismus Neg     Macular degeneration Neg     Glaucoma Neg     Amblyopia Neg    [5]   Social History  Socioeconomic History    Marital status:    Tobacco Use    Smoking status: Never    Smokeless tobacco: Never   Vaping Use    Vaping status: Never Used   Substance and Sexual Activity    Alcohol use: No    Drug use: No

## 2025-05-13 ENCOUNTER — TELEPHONE (OUTPATIENT)
Dept: ENDOCRINOLOGY CLINIC | Facility: CLINIC | Age: 75
End: 2025-05-13

## 2025-05-13 ENCOUNTER — OFFICE VISIT (OUTPATIENT)
Dept: ENDOCRINOLOGY CLINIC | Facility: CLINIC | Age: 75
End: 2025-05-13
Payer: MEDICARE

## 2025-05-13 VITALS
HEART RATE: 55 BPM | BODY MASS INDEX: 19.25 KG/M2 | HEIGHT: 68 IN | DIASTOLIC BLOOD PRESSURE: 72 MMHG | WEIGHT: 127 LBS | SYSTOLIC BLOOD PRESSURE: 125 MMHG

## 2025-05-13 DIAGNOSIS — Z79.4 TYPE 2 DIABETES MELLITUS WITH OTHER SPECIFIED COMPLICATION, WITH LONG-TERM CURRENT USE OF INSULIN (HCC): Primary | ICD-10-CM

## 2025-05-13 DIAGNOSIS — R17 HIGH BILIRUBIN: ICD-10-CM

## 2025-05-13 DIAGNOSIS — E55.9 VITAMIN D DEFICIENCY: ICD-10-CM

## 2025-05-13 DIAGNOSIS — E11.69 TYPE 2 DIABETES MELLITUS WITH OTHER SPECIFIED COMPLICATION, WITH LONG-TERM CURRENT USE OF INSULIN (HCC): Primary | ICD-10-CM

## 2025-05-13 DIAGNOSIS — E78.5 DYSLIPIDEMIA: ICD-10-CM

## 2025-05-13 LAB
GLUCOSE BLOOD: 81
HEMOGLOBIN A1C: 8.1 % (ref 4.3–5.6)
TEST STRIP LOT #: NORMAL NUMERIC

## 2025-05-13 PROCEDURE — 83036 HEMOGLOBIN GLYCOSYLATED A1C: CPT | Performed by: INTERNAL MEDICINE

## 2025-05-13 PROCEDURE — 99213 OFFICE O/P EST LOW 20 MIN: CPT | Performed by: INTERNAL MEDICINE

## 2025-05-13 PROCEDURE — 82947 ASSAY GLUCOSE BLOOD QUANT: CPT | Performed by: INTERNAL MEDICINE

## 2025-05-13 RX ORDER — INSULIN GLARGINE 100 [IU]/ML
22 INJECTION, SOLUTION SUBCUTANEOUS DAILY
Qty: 19.8 ML | Refills: 1 | Status: SHIPPED | OUTPATIENT
Start: 2025-05-13

## 2025-05-13 NOTE — TELEPHONE ENCOUNTER
Great thanks  There was something with the lab and a lot of us getting elevated bilis.  Hopefully normal if not yes please have him follow up with me   Thank you!

## 2025-05-13 NOTE — TELEPHONE ENCOUNTER
Hello, He had an elevated bilirubin on labs in Nov 2024    I am repeating it     If elevated will recommend he see you for further MCFARLANE       Thanks

## 2025-05-13 NOTE — PROGRESS NOTES
FU VISIT  DM    CHIEF COMPLAINT:    Chief Complaint   Patient presents with    Diabetes     Patient is in to follow up on diabetes, A1c check up          HISTORY OF PRESENT ILLNESS:   Luzmaria Phelan is a 74 year old male who is here to FU  for DM.     DM HISTORY  Diagnosed: 2005      HISTORY OF DIABETES COMPLICATIONS: :  History of Retinopathy: denies- last eye exam : is due for eye exam, will schedule soon, Dr. Cagle's information provided  History of Neuropathy: denies  History of Nephropathy: denies    ASSOCIATED COMPLICATIONS:   HTN: yes  Hyperlipidemia: yes  Coronary Artery Disease:  denies  Cerebrovascular Disease: denies      HOME GLUCOSE READINGS:   Fasting : 120-130    No low BG under 90    CURRENT DIABETIC MEDICATIONS INCLUDE:  Lantus  22 daily  MTF 1000 mg BID with food--> taking half tablet only       MEALS:  Compliance has not been very good     EXERCISE:  Yes, walks daily       CURRENT MEDICATIONS:    Current Outpatient Medications   Medication Sig Dispense Refill    lisinopril 10 MG Oral Tab Take 1 tablet (10 mg total) by mouth daily. 90 tablet 1    insulin glargine (LANTUS) 100 UNIT/ML Subcutaneous Solution Inject 22 Units into the skin daily. 19.8 mL 1    Insulin Syringe-Needle U-100 (BD INSULIN SYRINGE U/F) 30G X 1/2\" 0.5 ML Does not apply Misc Use once daily 100 each 1    metFORMIN HCl 1000 MG Oral Tab Take 1 tablet (1,000 mg total) by mouth 2 (two) times daily with meals. 180 tablet 1    atorvastatin 10 MG Oral Tab Take 1 tablet (10 mg total) by mouth nightly. 90 tablet 1    Microlet Lancets Does not apply Misc CHECK BLOOD SUGAR TWICE DAILY 200 each 1    Glucose Blood (CONTOUR NEXT TEST) In Vitro Strip Check daily 200 each 0    Blood Glucose Monitoring Suppl (CONTOUR NEXT MONITOR) w/Device Does not apply Kit 1 each 2 (two) times daily before meals. 1 kit 0    omeprazole 20 MG Oral Capsule Delayed Release Take 1 capsule (20 mg total) by mouth every morning before breakfast. (Patient taking  differently: Take 1 capsule (20 mg total) by mouth as needed.) 30 capsule 0    aspirin 81 MG Oral Tab Take 1 tablet (81 mg total) by mouth daily.         PAST MEDICAL HISTORY:   Past Medical History:    Diabetes (HCC)       PAST SURGICAL HISTORY:   Past Surgical History:   Procedure Laterality Date    Other surgical history      LEFT FOOT CRUSH INJURY AFTER TRAIN DERAILED       ALLERGIES:  No Known Allergies    SOCIAL HISTORY:    Social History     Socioeconomic History    Marital status:    Tobacco Use    Smoking status: Never    Smokeless tobacco: Never   Vaping Use    Vaping status: Never Used   Substance and Sexual Activity    Alcohol use: No    Drug use: No       FAMILY HISTORY:   Family History   Problem Relation Age of Onset    Diabetes Father     Lipids Brother     Heart Disorder Neg     Hypertension Neg     Retinal detachment Neg     Strabismus Neg     Macular degeneration Neg     Glaucoma Neg     Amblyopia Neg        ASSESSMENTS:        REVIEW OF SYSTEMS:  Constitutional: Negative for: weight change, fever, fatigue, cold/heat intolerance  Eyes: Negative for:  Visual changes, proptosis, blurring, floaters, poor night vision, impaired color vision  ENT: Negative for:  dysphagia, neck swelling, dysphonia  Respiratory: Negative for:  dyspnea, cough  Cardiovascular: Negative for:  chest pain, palpitations, orthopnea  GI: Negative for:  abdominal pain, nausea, vomiting, diarrhea, constipation, bleeding  Neurology: Negative for: headache, numbness, weakness,   Genito-Urinary: Negative for: dysuria, frequency  Psychiatric: Negative for:  depression, anxiety  Hematology/Lymphatics: Negative for: bruising, lower extremity edema  Endocrine: Negative for: polyuria, polydypsia  Skin: Negative for: rash, blister,      PHYSICAL EXAM:   Vitals:    05/13/25 1610   BP: 125/72   Pulse: 55   Weight: 127 lb (57.6 kg)   Height: 5' 8\" (1.727 m)     BMI: Body mass index is 19.31 kg/m².       General Appearance:  alert,  well developed, in no acute distress  Head: Atraumatic  Eyes:  normal conjunctivae, sclera., normal sclera and normal pupils  Throat/Neck: normal sound to voice. Normal hearing, normal speech  Respiratory:  Speaking in full sentences, non-labored. no increased work of breathing, no audible wheezing    Neuro: motor grossly intact, moving all extremities without difficulty  Psychiatric:  oriented to time, self, and place  Extremities: September 2024  Bilateral barefoot skin diabetic exam is normal, visualized feet and the appearance is normal except calluses--> b/l soles--> follows with podiatry  Bilateral monofilament/sensation of both feet is normal.  Pulsation pedal pulse exam of both lower legs/feet is normal as well.        DATA:     Pertinent data reviewed  a1c is 8.1  % ( 5/2025)    ASSESSMENT AND PLAN:    1. Type 2 DM: with hyperglycemia     Plan:  Discussed the pathogenesis, natural course of diabetes. Patient understands the importance of glycemic control and the implications of uncontrolled diabetes including Diabetic ketoacidosis and various micro vascular and macrovascular complications.        a). Medications:    Work on lifestyle changes    Metformin 1000 mg: increase to full tablet with BF, can continue half with dinner  ( do not take at bedtime)   GI SE reveiwed    Lantus 22 units  daily  Reviewed risk of hypoglycemia with insulin    Check and call with sugars as discussed     b). No h/o Nephropathy  c). Discussed importance of annual eye exams  d). Foot exam: Daily feet exam explained  e). BG log maintainence explained in great detail, to get log and glucometer on next visit.  f). Life style changes reviewed  g). Hypoglycemia recognition and management discussed    2. Patient’s BP is okay   3. Dyslipidemia  A) Discussed lifestyle modifications including reductions in dietary total and saturated fat, weight loss, aerobic exercise, and eating a diet rich in fruits and vegetables.  B) LDL is slightly  high   C/w statin: tolerating without SE  Will repeat fasting lipid panel    Noted high Truong on labs in Nov 2025   Clinically asymptomatic  Will repeat labs  Discussed that he should FU with Dr Valdez    He reports h/o vitamin D deficiency  Not on replacement at this time  Will check labs      RTC in 4 months  Check and call with sugars as discussed  Patient verbalized a complete  understanding of all of the above and did not have any further questions.       Orders Placed This Encounter   Procedures    POC HemoCue Glucose 201 (Finger stick glucose)    POC Glycohemoglobin [22976]    Liver Function Panel (7)    Lipid Panel [E]    Vitamin D [E]         Opal Bailey MD

## 2025-06-10 ENCOUNTER — TELEPHONE (OUTPATIENT)
Dept: ENDOCRINOLOGY CLINIC | Facility: CLINIC | Age: 75
End: 2025-06-10

## 2025-06-10 NOTE — TELEPHONE ENCOUNTER
Received fax from [facility] attached is a new prescription for diabetes supplies, form placed in provider folder for review and signature.

## 2025-06-20 ENCOUNTER — OFFICE VISIT (OUTPATIENT)
Facility: LOCATION | Age: 75
End: 2025-06-20
Payer: MEDICARE

## 2025-06-20 DIAGNOSIS — M77.41 METATARSALGIA OF BOTH FEET: ICD-10-CM

## 2025-06-20 DIAGNOSIS — M77.42 METATARSALGIA OF BOTH FEET: ICD-10-CM

## 2025-06-20 DIAGNOSIS — M21.962 DEFORMITY OF BOTH FEET: ICD-10-CM

## 2025-06-20 DIAGNOSIS — E08.621 DIABETIC ULCER OF OTHER PART OF RIGHT FOOT ASSOCIATED WITH DIABETES MELLITUS DUE TO UNDERLYING CONDITION, WITH FAT LAYER EXPOSED (HCC): Primary | ICD-10-CM

## 2025-06-20 DIAGNOSIS — M79.672 BILATERAL FOOT PAIN: ICD-10-CM

## 2025-06-20 DIAGNOSIS — L90.9 PLANTAR FAT PAD ATROPHY: ICD-10-CM

## 2025-06-20 DIAGNOSIS — M79.671 BILATERAL FOOT PAIN: ICD-10-CM

## 2025-06-20 DIAGNOSIS — M21.961 DEFORMITY OF BOTH FEET: ICD-10-CM

## 2025-06-20 DIAGNOSIS — M20.11 HALLUX VALGUS, RIGHT: ICD-10-CM

## 2025-06-20 DIAGNOSIS — L84 PRE-ULCERATIVE CALLUSES: ICD-10-CM

## 2025-06-20 DIAGNOSIS — L97.512 DIABETIC ULCER OF OTHER PART OF RIGHT FOOT ASSOCIATED WITH DIABETES MELLITUS DUE TO UNDERLYING CONDITION, WITH FAT LAYER EXPOSED (HCC): Primary | ICD-10-CM

## 2025-06-20 DIAGNOSIS — E11.42 DIABETIC POLYNEUROPATHY ASSOCIATED WITH TYPE 2 DIABETES MELLITUS (HCC): ICD-10-CM

## 2025-06-20 PROCEDURE — 99213 OFFICE O/P EST LOW 20 MIN: CPT | Performed by: PODIATRIST

## 2025-06-20 NOTE — PROGRESS NOTES
The following individual(s) verbally consented to be recorded using ambient AI listening technology and understand that they can each withdraw their consent to this listening technology at any point by asking the clinician to turn off or pause the recording:    Patient name: Luzmaria Phelan  Additional names:  Lilliam Phelan, Spouse

## 2025-06-20 NOTE — PROGRESS NOTES
[facility]  Progress Note      Luzmaria Phelan is a 74 year old male.   Chief Complaint   Patient presents with    Diabetic Foot Care     Diabetic foot check:  A1c on 5/13: 8.1  LOV with ENDO: Dr. Opal Bailey           HPI:     The following individual(s) verbally consented to be recorded using ambient AI listening technology and understand that they can each withdraw their consent to this listening technology at any point by asking the clinician to turn off or pause the recording:      History of Present Illness  Luzmaria Phelan is a 74 year old diabetic male who presents with chronic callus formation on his foot.    He has persistent callus formation on his left foot, described as a 'dark callus' that continues to grow despite moisturizing with oil and Vaseline. The callus is exacerbated by chronic pressure from walking, although it is not currently causing significant pain. There was a previous episode where the callus became problematic, managed with ointment. An area of the callus seems prone to opening due to shoe rubbing, but it is not currently open.    He experiences discomfort when walking, primarily due to shoe pressure on the callus. He uses good shoes to mitigate this issue. A deformity from a past injury contributes to the pressure and callus formation.     He is aware of muscle weakening with age and inquires about exercises to maintain foot muscle strength. He engages in walking and foot exercises to keep his muscles active.      Allergies: Patient has no known allergies.   Current Medications[1]   Past Medical History[2]   Past Surgical History[3]   Family History[4]   Social Hx on file[5]        REVIEW OF SYSTEMS:     10 point ROS completed and was negative unless stated in HPI.      EXAM:     GENERAL: well developed, well nourished, in no apparent distress  EXTREMITIES:  1. Integument: Toenails x 10 are of normal morphology and adequate length.  Skin appears dry, warm, and  supple. There are no color changes. No open lesions. No macerations.  Hyperkeratotic lesions noted to medial eminence of first MPJ and sub fourth metatarsal head of the right foot, and sub-third metatarsal head of the left foot.  Patient also has HPK noted to lateral aspect of left fourth digit.  No signs of infection bilaterally.  Upon debridement, there is no open ulcerations noted.  There was subdermal hemorrhaging noted to right medial eminence HPK with no open ulceration or signs of infection upon debridement.  2. Vascular: Dorsalis pedis 2/4 bilateral and posterior tibial pulses 2/4 bilateral, capillary refill normal.  3. Neurological: Gross sensation intact via light touch bilaterally.  Normal sharp/dull sensation  4. Musculoskeletal: All muscle groups are graded 5/5 in the foot and ankle.  No pain with palpation of bilateral feet today.       ASSESSMENT AND PLAN:   Diagnoses and all orders for this visit:    Diabetic ulcer of other part of right foot associated with diabetes mellitus due to underlying condition, with fat layer exposed (HCC)    Pre-ulcerative calluses    Diabetic polyneuropathy associated with type 2 diabetes mellitus (HCC)    Deformity of both feet    Hallux valgus, right    Plantar fat pad atrophy    Bilateral foot pain    Metatarsalgia of both feet        Plan:   -Patient was seen and evaluated today in clinic.  Chart history reviewed.    Assessment & Plan  Pre-ulcerative calluses  Chronic calluses due to foot deformity and pressure. Asymptomatic but require management to prevent ulceration.  - Trim calluses during visit today without incident.  - Recommend moisturizing 1-2 times daily.    - Provide recommendations for moisturizers.  - Provide offloading pads to reduce pressure on callus areas.  - Advise to report any signs of ulceration or drainage.    Deformity of both feet  Chronic foot deformity causing pressure points and callus formation. Potential for ulceration exists.  - Discuss  outpatient procedure to shave down bone if ulceration occurs and does not heal.  - Provide silicone spacer to alleviate discomfort from interdigital gaps.    Plantar fat pad atrophy  Age-related atrophy causing discomfort and pressure points, worsened by previous injuries and chronic pressure.  - Discuss role of foot exercises and ambulation in maintaining muscle stability.  - Provide handout with foot exercises to strengthen muscles.      -All of the patient's questions and concerns were addressed.  They indicated their understanding of these issues and agrees to the plan.      RTC 6-8 weeks    Jaron Perry DPM, AACFAS        6/20/2025    [facility]  [address] [place] [id number]   [email or web address]            Dragon speech recognition software was used to prepare this note.  Errors in word recognition may occur.  Please contact me with any questions/concerns with this note.          [1]   Current Outpatient Medications   Medication Sig Dispense Refill    Glucose Blood (CONTOUR NEXT TEST) In Vitro Strip CHECK BLOOD SUGAR TWICE DAILY 200 strip 3    metFORMIN HCl 1000 MG Oral Tab Take 1 tablet (1,000 mg total) by mouth daily with breakfast AND 0.5 tablets (500 mg total) daily with dinner. 270 tablet 1    insulin glargine (LANTUS) 100 UNIT/ML Subcutaneous Solution Inject 22 Units into the skin daily. 19.8 mL 1    lisinopril 10 MG Oral Tab Take 1 tablet (10 mg total) by mouth daily. 90 tablet 1    Insulin Syringe-Needle U-100 (BD INSULIN SYRINGE U/F) 30G X 1/2\" 0.5 ML Does not apply Misc Use once daily 100 each 1    atorvastatin 10 MG Oral Tab Take 1 tablet (10 mg total) by mouth nightly. 90 tablet 1    Microlet Lancets Does not apply Misc CHECK BLOOD SUGAR TWICE DAILY 200 each 1    Blood Glucose Monitoring Suppl (CONTOUR NEXT MONITOR) w/Device Does not apply Kit 1 each 2 (two) times daily before meals. 1 kit 0    omeprazole 20 MG Oral Capsule Delayed Release Take 1  capsule (20 mg total) by mouth every morning before breakfast. (Patient taking differently: Take 1 capsule (20 mg total) by mouth as needed.) 30 capsule 0    aspirin 81 MG Oral Tab Take 1 tablet (81 mg total) by mouth daily.     [2]   Past Medical History:   Diabetes (HCC)   [3]   Past Surgical History:  Procedure Laterality Date    Other surgical history      LEFT FOOT CRUSH INJURY AFTER TRAIN DERAILED   [4]   Family History  Problem Relation Age of Onset    Diabetes Father     Lipids Brother     Heart Disorder Neg     Hypertension Neg     Retinal detachment Neg     Strabismus Neg     Macular degeneration Neg     Glaucoma Neg     Amblyopia Neg    [5]   Social History  Socioeconomic History    Marital status:    Tobacco Use    Smoking status: Never    Smokeless tobacco: Never   Vaping Use    Vaping status: Never Used   Substance and Sexual Activity    Alcohol use: No    Drug use: No

## 2025-07-29 ENCOUNTER — TELEPHONE (OUTPATIENT)
Dept: FAMILY MEDICINE CLINIC | Facility: CLINIC | Age: 75
End: 2025-07-29

## 2025-07-29 ENCOUNTER — MED REC SCAN ONLY (OUTPATIENT)
Dept: FAMILY MEDICINE CLINIC | Facility: CLINIC | Age: 75
End: 2025-07-29

## 2025-08-20 DIAGNOSIS — E11.65 UNCONTROLLED TYPE 2 DIABETES MELLITUS WITH HYPERGLYCEMIA (HCC): ICD-10-CM

## 2025-08-20 RX ORDER — LISINOPRIL 10 MG/1
10 TABLET ORAL DAILY
Qty: 90 TABLET | Refills: 0 | Status: SHIPPED | OUTPATIENT
Start: 2025-08-20

## 2025-08-23 RX ORDER — PEN NEEDLE, DIABETIC 29 G X1/2"
1 NEEDLE, DISPOSABLE MISCELLANEOUS DAILY
Qty: 100 EACH | Refills: 0 | Status: SHIPPED | OUTPATIENT
Start: 2025-08-23

## 2025-08-26 ENCOUNTER — OFFICE VISIT (OUTPATIENT)
Facility: LOCATION | Age: 75
End: 2025-08-26

## 2025-08-26 DIAGNOSIS — L97.512 DIABETIC ULCER OF OTHER PART OF RIGHT FOOT ASSOCIATED WITH DIABETES MELLITUS DUE TO UNDERLYING CONDITION, WITH FAT LAYER EXPOSED (HCC): Primary | ICD-10-CM

## 2025-08-26 DIAGNOSIS — L84 PRE-ULCERATIVE CALLUSES: ICD-10-CM

## 2025-08-26 DIAGNOSIS — L90.9 PLANTAR FAT PAD ATROPHY: ICD-10-CM

## 2025-08-26 DIAGNOSIS — L97.522 DIABETIC ULCER OF TOE OF LEFT FOOT ASSOCIATED WITH TYPE 2 DIABETES MELLITUS, WITH FAT LAYER EXPOSED (HCC): ICD-10-CM

## 2025-08-26 DIAGNOSIS — M21.961 DEFORMITY OF BOTH FEET: ICD-10-CM

## 2025-08-26 DIAGNOSIS — M20.11 HALLUX VALGUS, RIGHT: ICD-10-CM

## 2025-08-26 DIAGNOSIS — E11.42 DIABETIC POLYNEUROPATHY ASSOCIATED WITH TYPE 2 DIABETES MELLITUS (HCC): ICD-10-CM

## 2025-08-26 DIAGNOSIS — E08.621 DIABETIC ULCER OF OTHER PART OF RIGHT FOOT ASSOCIATED WITH DIABETES MELLITUS DUE TO UNDERLYING CONDITION, WITH FAT LAYER EXPOSED (HCC): Primary | ICD-10-CM

## 2025-08-26 DIAGNOSIS — M21.962 DEFORMITY OF BOTH FEET: ICD-10-CM

## 2025-08-26 DIAGNOSIS — E11.621 DIABETIC ULCER OF TOE OF LEFT FOOT ASSOCIATED WITH TYPE 2 DIABETES MELLITUS, WITH FAT LAYER EXPOSED (HCC): ICD-10-CM

## (undated) DIAGNOSIS — E11.65 UNCONTROLLED TYPE 2 DIABETES MELLITUS WITH HYPERGLYCEMIA (HCC): ICD-10-CM

## (undated) DIAGNOSIS — E11.65 UNCONTROLLED TYPE 2 DIABETES MELLITUS WITH HYPERGLYCEMIA (HCC): Primary | ICD-10-CM

## (undated) DIAGNOSIS — E11.42 DIABETIC POLYNEUROPATHY ASSOCIATED WITH TYPE 2 DIABETES MELLITUS (HCC): ICD-10-CM

## (undated) NOTE — LETTER
05/31/23        Luzmaria Samuel      Dear Payal Aguilera,    Our records indicate that you have outstanding lab work and or testing that was ordered for you and has not yet been completed:  Orders Placed This Encounter      Lipid Panel      Comp Metabolic Panel (14)      Urinalysis, Routine      PSA Total, Screen      Vitamin B12      Hemoglobin A1C      Microalb/Creat Ratio, Random Urine      Occult Blood, Fecal, FIT Immunoassay    To provide you with the best possible care, please complete these orders at your earliest convenience. If you have recently completed these orders please disregard this letter. If you have any questions please call the office at Dept: [phone]. Thank you,       Luis Alfredo Pal.  Carmela Guerrero MD

## (undated) NOTE — LETTER
January 9, 2018         [place], [address]  [id number]      Patient: Ge Toledo   YOB: 1950   Date of Visit: 1/9/2018       Dear Dr. Rogelio Salmeron MD,    I saw your patient, Ge Toledo, on 1/9/2018.  Scarlett Gutierrez

## (undated) NOTE — LETTER
02/05/18        Luzmaria Phelan  [address] [facility]      Dear Martin Gavin,    [facility] records indicate that you have outstanding lab work and or testing that was ordered for you and has not yet been completed:          ALT(SGPT) [E]      AS

## (undated) NOTE — LETTER
01/07/21        Luzmaria Phelan  [address] [address]      Dear Rachel Leyva,    Our records indicate that you have outstanding lab work and or testing that was ordered for you and has not yet been completed:  Orders Placed This Encounter

## (undated) NOTE — LETTER
May 17, 2018    Calvin Ledbetter MD   [address] [id number]     Patient: Angel Munoz   YOB: 1950   Date of Visit: 5/17/2018       Dear Dr. Yonis Acevedo MD:    Thank you for referring Mary Arevalo to me for evaluation.  Here i Pantoprazole Sodium 40 MG Oral Tab EC Take 40 mg by mouth every morning before breakfast. Patient reports taking prn Disp:  Rfl:    insulin detemir (LEVEMIR) 100 UNIT/ML Subcutaneous Solution Inject into the skin daily.  Inject 25IU daily Disp:  Rfl:    asp Sphere Cylinder [place] Add [place]    Right +2.50 +0.25 135 20/20- +2.25 20/20    Left +2.75 +0.50 125 20/20- +2.25 20/20    Type:  Progressive bifocal                 ASSESSMENT/PLAN:     Diagnoses and Plan:     Age-related nuclear cataract of marissa

## (undated) NOTE — Clinical Note
Thank you for the consult. I saw Mr. Cruz Just in the endocrine/diabetes clinic today. Please see attached my note. Please feel free to contact me with any questions. Thanks!